# Patient Record
Sex: FEMALE | Race: WHITE | NOT HISPANIC OR LATINO | Employment: FULL TIME | URBAN - METROPOLITAN AREA
[De-identification: names, ages, dates, MRNs, and addresses within clinical notes are randomized per-mention and may not be internally consistent; named-entity substitution may affect disease eponyms.]

---

## 2017-02-13 ENCOUNTER — OFFICE VISIT (OUTPATIENT)
Dept: LAB | Facility: SCHOOL | Age: 47
End: 2017-02-13
Payer: COMMERCIAL

## 2017-02-13 VITALS
BODY MASS INDEX: 35.89 KG/M2 | DIASTOLIC BLOOD PRESSURE: 94 MMHG | RESPIRATION RATE: 16 BRPM | SYSTOLIC BLOOD PRESSURE: 148 MMHG | WEIGHT: 236.8 LBS | TEMPERATURE: 99.9 F | HEART RATE: 88 BPM | OXYGEN SATURATION: 98 % | HEIGHT: 68 IN

## 2017-02-13 DIAGNOSIS — H60.93 OTITIS EXTERNA OF BOTH EARS, UNSPECIFIED CHRONICITY, UNSPECIFIED TYPE: Primary | ICD-10-CM

## 2017-02-13 PROCEDURE — 99213 OFFICE O/P EST LOW 20 MIN: CPT | Performed by: NURSE PRACTITIONER

## 2017-02-13 RX ORDER — CIPROFLOXACIN AND DEXAMETHASONE 3; 1 MG/ML; MG/ML
4 SUSPENSION/ DROPS AURICULAR (OTIC) 2 TIMES DAILY
Qty: 7.5 ML | Refills: 0 | Status: SHIPPED | OUTPATIENT
Start: 2017-02-13 | End: 2017-02-20

## 2017-02-13 NOTE — MR AVS SNAPSHOT
After Visit Summary   2/13/2017    Piper Unger    MRN: [id number]           Patient Information     Date Of Birth          1970        Visit Information        Provider Department      2/13/2017 1:30 PM Jacki Canas APRN CNP [facility] 831        Today's Diagnoses     Otitis externa of both ears, unspecified chronicity, unspecified type    -  1      Care Instructions    Thank you for using the [facility]31 Clinic.  If there is no improvement of your condition, please call and schedule an appointment with your primary care provider.    The medication (s), dosing, route and duration was discussed with the patient.  In addition the drug monograph was reviewed and given to the patient for the medication (s).    External Ear Infection (Adult)    External otitis (also called  swimmer s ear ) is an infection in the ear canal. It is often caused by bacteria or fungus. It can occur a few days after water gets trapped in the ear canal (from swimming or bathing). It can also occur after cleaning too deeply in the ear canal with a cotton swab or other object. Sometimes, hair care products get into the ear canal and cause this problem.  Symptoms can include pain, fever, itching, redness, drainage, or swelling of the ear canal. Temporary hearing loss may also occur.  Home care    Do not try to clean the ear canal. This can push pus and bacteria deeper into the canal.    Use prescribed ear drops as directed. These help reduce swelling and fight the infection. If an ear wick was placed in the ear canal, apply drops right onto the end of the wick. The wick will draw the medication into the ear canal even if it is swollen closed.    A cotton ball may be loosely placed in the outer ear to absorb any drainage.    You may use acetaminophen or ibuprofen to control pain, unless another medication was prescribed. Note: If you have chronic liver or kidney disease or  ever had a stomach ulcer or GI bleeding, talk to your health care provider before taking any of these medications.    Do not allow water to get into your ear when bathing. Also, avoid swimming until the infection has cleared.  Prevention    Keep your ears dry. This helps lower the risk of infection. Dry your ears with a towel or hair dryer after getting wet. Also, use ear plugs when swimming.    Do not stick any objects in the ear to remove wax.    If you feel water trapped in your ear, use ear drops right away. You can get these drops over the counter at most drugstores.  They work by removing water from the ear canal.  Follow-up care  Follow up with your health care provider in one week, or as advised.   When to seek medical advice  Call your health care provider right away if any of these occur:    Ear pain becomes worse or doesn t improve after 3 days of treatment    Redness or swelling of the outer ear occurs or gets worse    Headache    Painful or stiff neck    Drowsiness or confusion    Fever of 100.4 F (38 C) or higher, or as directed by your health care provider    Seizure    1781-6360 [employer]. [address] [place] [address]. All rights reserved. This information is not intended as a substitute for professional medical care. Always follow your healthcare professional's instructions.              Follow-ups after your visit        Who to contact     If you have questions or need follow up information about today's clinic visit or your schedule please contact [place] [address] directly at [phone].  Normal or non-critical lab and imaging results will be communicated to you by MyChart, letter or phone within 4 business days after the clinic has received the results. If you do not hear from us within 7 days, please contact the clinic through MyChart or phone. If you have a critical or abnormal lab result, we will notify you by phone as soon as  "possible.  Submit refill requests through SocialStay or call your pharmacy and they will forward the refill request to us. Please allow 3 business days for your refill to be completed.          Additional Information About Your Visit        SocialStay Information     SocialStay lets you send messages to your doctor, view your test results, renew your prescriptions, schedule appointments and more. To sign up, go to [email or web address] . Click on \"Log in\" on the left side of the screen, which will take you to the Welcome page. Then click on \"Sign up Now\" on the right side of the page.     You will be asked to enter the access code listed below, as well as some personal information. Please follow the directions to create your username and password.     Your access code is: [email or web address]  Expires: 2017  1:59 PM     Your access code will  in 90 days. If you need help or a new code, please call your [place] clinic or [phone].        Care EveryWhere ID     This is your Care EveryWhere ID. This could be used by other organizations to access your [facility] medical records  [id number]        Your Vitals Were     Pulse Temperature Respirations Height Pulse Oximetry BMI (Body Mass Index)    88 99.9  F (37.7  C) (Tympanic) 16 5' 8\" (1.727 m) 98% 36.01 kg/m2       Blood Pressure from Last 3 Encounters:   17 (!) 148/94   11/25/15 165/80   10/22/15 (!) 163/91    Weight from Last 3 Encounters:   17 236 lb 12.8 oz (107.4 kg)   11/25/15 215 lb (97.5 kg)   10/22/15 222 lb (100.7 kg)              Today, you had the following     No orders found for display         Today's Medication Changes          These changes are accurate as of: 17  1:59 PM.  If you have any questions, ask your nurse or doctor.               Start taking these medicines.        Dose/Directions    ciprofloxacin-dexamethasone otic suspension   Commonly known as:  CIPRODEX   Used for:  Otitis externa of both ears, unspecified chronicity, " unspecified type   Started by:  Jacki Canas APRN CNP        Dose:  4 drop   Place 4 drops into both ears 2 times daily for 7 days   Quantity:  7.5 mL   Refills:  0            Where to get your medicines      Some of these will need a paper prescription and others can be bought over the counter.  Ask your nurse if you have questions.     Bring a paper prescription for each of these medications     ciprofloxacin-dexamethasone otic suspension                Primary Care Provider Office Phone # Fax #    Tayler Sobia Whitlock [phone]       [facility] [address] [facility] [address]        Thank you!     Thank you for choosing [place] [address]  for your care. Our goal is always to provide you with excellent care. Hearing back from our patients is one way we can continue to improve our services. Please take a few minutes to complete the written survey that you may receive in the mail after your visit with us. Thank you!             Your Updated Medication List - Protect others around you: Learn how to safely use, store and throw away your medicines at www.disposemymeds.org.          This list is accurate as of: 2/13/17  1:59 PM.  Always use your most recent med list.                   Brand Name Dispense Instructions for use    ciprofloxacin-dexamethasone otic suspension    CIPRODEX    7.5 mL    Place 4 drops into both ears 2 times daily for 7 days       CLARITIN PO      Take by mouth daily       * clobetasol propionate 0.05 % Foam     100 g    Apply sparingly to affected area twice daily as needed.  Do not apply to face. 100 g should last 1 month atleast.       * clobetasol 0.05 % cream    TEMOVATE    30 g    Apply sparingly to affected area twice daily for 14 days.  Do not apply to face.       guaiFENesin 600 MG 12 hr tablet    MUCINEX     Take 600 mg by mouth 2 times daily Reported on 2/13/2017       ketoconazole 2 % shampoo    NIZORAL    120 mL    Apply  to the affected area and wash off after 5 minutes. Use 2-3 times a week.       * Notice:  This list has 2 medication(s) that are the same as other medications prescribed for you. Read the directions carefully, and ask your doctor or other care provider to review them with you.

## 2017-02-13 NOTE — PROGRESS NOTES
SUBJECTIVE:                                                    Piper Unger is a 46 year old female who presents to clinic today for the following health issues:      ENT Symptoms             Symptoms: cc Present Absent Comment   Fever/Chills   x    Fatigue   x    Muscle Aches   x    Eye Irritation   x    Sneezing   x    Nasal Froylan/Drg   x    Sinus Pressure/Pain   x    Loss of smell   x    Dental pain   x    Sore Throat   x    Swollen Glands   x    Ear Pain/Fullness x   Bilateral ear pain and itching, wet, bubbly feeling   Cough   x    Wheeze   x    Chest Pain   x    Shortness of breath   x    Rash   x    Other         Symptom duration:  off and on x2 years, most recent symptoms the last couple weeks   Symptom severity:  moderate   Treatments tried:  ear drops, antibiotic for staph infection, had ears vacuumed out, alcohol and vinegar drops in ear, cream and shampoos   Contacts:  none       Problem list and histories reviewed & adjusted, as indicated.  Additional history: as documented    Patient Active Problem List   Diagnosis     Allergic rhinitis due to other allergen     Irregular menstrual cycle     Other chest pain     Bleeding between periods     Hypothyroidism     Excessive sweating     HYPERLIPIDEMIA LDL GOAL <130     Warts     Obesity (BMI 30-39.9)     Past Surgical History   Procedure Laterality Date     No history of surgery         Social History   Substance Use Topics     Smoking status: Never Smoker     Smokeless tobacco: Never Used     Alcohol use Yes      Comment: occasional     Family History   Problem Relation Age of Onset     Other - See Comments Father      fluid in lungs     Hypertension Father      Allergies Son      eczma     Lipids Sister            ROS:  Constitutional, HEENT, cardiovascular, pulmonary, gi and gu systems are negative, except as otherwise noted.    OBJECTIVE:                                                    BP (!) 148/94 (BP Location: Right arm, Patient Position:  "Chair, Cuff Size: Adult Large)  Pulse 88  Temp 99.9  F (37.7  C) (Tympanic)  Resp 16  Ht 5' 8\" (1.727 m)  Wt 236 lb 12.8 oz (107.4 kg)  SpO2 98%  BMI 36.01 kg/m2  Body mass index is 36.01 kg/(m^2).  GENERAL: healthy, alert and no distress  EYES: Eyes grossly normal to inspection, PERRL and conjunctivae and sclerae normal  HENT: normal cephalic/atraumatic, both ears: purulent drainage in canal and red and boggy canal, nose and mouth without ulcers or lesions, oropharynx clear and oral mucous membranes moist  NECK: no adenopathy and no asymmetry, masses, or scars  RESP: lungs clear to auscultation - no rales, rhonchi or wheezes  CV: regular rates and rhythm and normal S1 S2, no S3 or S4  SKIN: no suspicious lesions or rashes  NEURO: Normal strength and tone, mentation intact and speech normal    Diagnostic Test Results:  none      ASSESSMENT/PLAN:                                                        ICD-10-CM    1. Otitis externa of both ears, unspecified chronicity, unspecified type H60.93 ciprofloxacin-dexamethasone (CIPRODEX) otic suspension       CONSULTATION/REFERRAL to DERMATOLOGY when infection healed.     FUTURE APPOINTMENTS:       - Follow-up visit in 3-5 days with PCP or ENT for persistent symptoms. Sooner PRN new or worsening sx. See PCP regarding ongoing high blood pressures.     Patient Instructions   Thank you for using the [facility] Clinic.  If there is no improvement of your condition, please call and schedule an appointment with your primary care provider.    The medication (s), dosing, route and duration was discussed with the patient.  In addition the drug monograph was reviewed and given to the patient for the medication (s).    External Ear Infection (Adult)    External otitis (also called  swimmer s ear ) is an infection in the ear canal. It is often caused by bacteria or fungus. It can occur a few days after water gets trapped in the ear canal (from swimming or bathing). It " can also occur after cleaning too deeply in the ear canal with a cotton swab or other object. Sometimes, hair care products get into the ear canal and cause this problem.  Symptoms can include pain, fever, itching, redness, drainage, or swelling of the ear canal. Temporary hearing loss may also occur.  Home care    Do not try to clean the ear canal. This can push pus and bacteria deeper into the canal.    Use prescribed ear drops as directed. These help reduce swelling and fight the infection. If an ear wick was placed in the ear canal, apply drops right onto the end of the wick. The wick will draw the medication into the ear canal even if it is swollen closed.    A cotton ball may be loosely placed in the outer ear to absorb any drainage.    You may use acetaminophen or ibuprofen to control pain, unless another medication was prescribed. Note: If you have chronic liver or kidney disease or ever had a stomach ulcer or GI bleeding, talk to your health care provider before taking any of these medications.    Do not allow water to get into your ear when bathing. Also, avoid swimming until the infection has cleared.  Prevention    Keep your ears dry. This helps lower the risk of infection. Dry your ears with a towel or hair dryer after getting wet. Also, use ear plugs when swimming.    Do not stick any objects in the ear to remove wax.    If you feel water trapped in your ear, use ear drops right away. You can get these drops over the counter at most drugstores.  They work by removing water from the ear canal.  Follow-up care  Follow up with your health care provider in one week, or as advised.   When to seek medical advice  Call your health care provider right away if any of these occur:    Ear pain becomes worse or doesn t improve after 3 days of treatment    Redness or swelling of the outer ear occurs or gets worse    Headache    Painful or stiff neck    Drowsiness or confusion    Fever of 100.4 F (38 C) or higher,  or as directed by your health care provider    Seizure    2869-7708 [employer]. [address] [place] [address], [place] [address]. All rights reserved. This information is not intended as a substitute for professional medical care. Always follow your healthcare professional's instructions.            LUPE Tripathi [facility]

## 2017-02-13 NOTE — NURSING NOTE
"Chief Complaint   Patient presents with     Otalgia     bilateral       Initial BP (!) 148/94 (BP Location: Right arm, Patient Position: Chair, Cuff Size: Adult Large)  Pulse 88  Temp 99.9  F (37.7  C) (Tympanic)  Resp 16  Ht 5' 8\" (1.727 m)  Wt 236 lb 12.8 oz (107.4 kg)  SpO2 98%  BMI 36.01 kg/m2 Estimated body mass index is 36.01 kg/(m^2) as calculated from the following:    Height as of this encounter: 5' 8\" (1.727 m).    Weight as of this encounter: 236 lb 12.8 oz (107.4 kg).  Medication Reconciliation: complete  "

## 2017-02-13 NOTE — PATIENT INSTRUCTIONS
Thank you for using the [facility]  Clinic.  If there is no improvement of your condition, please call and schedule an appointment with your primary care provider.    The medication (s), dosing, route and duration was discussed with the patient.  In addition the drug monograph was reviewed and given to the patient for the medication (s).    External Ear Infection (Adult)    External otitis (also called  swimmer s ear ) is an infection in the ear canal. It is often caused by bacteria or fungus. It can occur a few days after water gets trapped in the ear canal (from swimming or bathing). It can also occur after cleaning too deeply in the ear canal with a cotton swab or other object. Sometimes, hair care products get into the ear canal and cause this problem.  Symptoms can include pain, fever, itching, redness, drainage, or swelling of the ear canal. Temporary hearing loss may also occur.  Home care    Do not try to clean the ear canal. This can push pus and bacteria deeper into the canal.    Use prescribed ear drops as directed. These help reduce swelling and fight the infection. If an ear wick was placed in the ear canal, apply drops right onto the end of the wick. The wick will draw the medication into the ear canal even if it is swollen closed.    A cotton ball may be loosely placed in the outer ear to absorb any drainage.    You may use acetaminophen or ibuprofen to control pain, unless another medication was prescribed. Note: If you have chronic liver or kidney disease or ever had a stomach ulcer or GI bleeding, talk to your health care provider before taking any of these medications.    Do not allow water to get into your ear when bathing. Also, avoid swimming until the infection has cleared.  Prevention    Keep your ears dry. This helps lower the risk of infection. Dry your ears with a towel or hair dryer after getting wet. Also, use ear plugs when swimming.    Do not stick any objects in the  ear to remove wax.    If you feel water trapped in your ear, use ear drops right away. You can get these drops over the counter at most drugstores.  They work by removing water from the ear canal.  Follow-up care  Follow up with your health care provider in one week, or as advised.   When to seek medical advice  Call your health care provider right away if any of these occur:    Ear pain becomes worse or doesn t improve after 3 days of treatment    Redness or swelling of the outer ear occurs or gets worse    Headache    Painful or stiff neck    Drowsiness or confusion    Fever of 100.4 F (38 C) or higher, or as directed by your health care provider    Seizure    2286-4331 [employer]. [address] [place] [address], [place] [address]. All rights reserved. This information is not intended as a substitute for professional medical care. Always follow your healthcare professional's instructions.

## 2017-04-12 ENCOUNTER — OFFICE VISIT (OUTPATIENT)
Dept: LAB | Facility: SCHOOL | Age: 47
End: 2017-04-12
Payer: COMMERCIAL

## 2017-04-12 VITALS — HEART RATE: 87 BPM | SYSTOLIC BLOOD PRESSURE: 144 MMHG | DIASTOLIC BLOOD PRESSURE: 82 MMHG

## 2017-04-12 DIAGNOSIS — I10 BENIGN ESSENTIAL HYPERTENSION: Primary | ICD-10-CM

## 2017-04-12 DIAGNOSIS — E66.9 OBESITY (BMI 30-39.9): ICD-10-CM

## 2017-04-12 PROCEDURE — 99213 OFFICE O/P EST LOW 20 MIN: CPT | Performed by: NURSE PRACTITIONER

## 2017-04-12 NOTE — MR AVS SNAPSHOT
After Visit Summary   4/12/2017    Piper Unger    MRN: [id number]           Patient Information     Date Of Birth          1970        Visit Information        Provider Department      4/12/2017 9:30 AM Jacki Canas APRN [facility]         Care Instructions      Women and Heart Disease: Understanding the Risks  Risk factors are habits and conditions that make heart disease more likely. The more you have, the higher your chances of heart attack, also known as acute myocardial infarction, or AMI, and other problems. Most risk factors can be managed to help make your heart healthier. Below are factors that increase your risk for having heart disease.    Smoking  This is the biggest of all the risk factors you can change. Smoking damages the lining of the blood vessels and raises blood pressure. Research shows that smoking makes women up to 6 times more likely to have a heart attack. Also avoid secondhand smoke (smoke from other people s tobacco products).  Diabetes  Diabetes causes high blood sugar, which can damage blood vessels if not kept under control. Having diabetes also makes you more likely to have a silent heart attack--one without any symptoms. You re at risk if your blood sugar level is above 100 mg/dL.  Unhealthy lipid levels  Lipids are fatty substances in the blood. LDL cholesterol and triglycerides (both bad lipids) can build up in artery walls, narrowing the arteries. HDL cholesterol (a good lipid) helps clear bad lipids away. You re at risk if you have:  HDL cholesterol 50 mg/dL or lower; LDL cholesterol 100 mg/dL or higher; triglycerides of 150 mg/dL or higher.  High blood pressure  High blood pressure occurs when blood pushes too hard against artery walls as it travels through the arteries. This damages the lining of the blood vessels making them narrow and stiff. You re at risk if your blood pressure is 120/80 or higher.  Excess  weight  Excess weight makes your heart work harder. This raises your risk of a heart attack. Being overweight also puts you at risk of developing diabetes. Excess weight around the waist or stomach increases your risk the most. Being obese puts you at risk for developing heart disease.  Lack of exercise  Without regular exercise, you re more likely to develop other risk factors, such as being overweight and developing diabetes. High blood pressure and unhealthy lipid levels are also more likely.  Negative emotions  Emotions such as stress and pent-up anger have been linked to heart disease. Over time, these emotions could raise your heart disease risk. If you have heart disease, emotion such as anxiety and depression can make it worse.  Metabolic syndrome  This is caused by a combination of certain risk factors. It puts you at extra high risk of heart disease, stroke, and diabetes. You have metabolic syndrome if you have three or more of the following: low HDL cholesterol; high triglycerides; high blood pressure; high blood sugar; extra weight around the waist.  Risks you can t control  A few risk factors can t be changed. But they still raise your heart disease risk.    Family history. If your mother or sister had heart trouble before age 65 or your father or brother before age 55, you re at higher risk of having a heart attack.    Age. The older you are, the higher your heart disease risk.     For more information    smokefree.gov/sjat-wt-zx-expert    women.smokefree.gov    National [employer] [place] Smoking Quitline: 877-44U-QUIT (101-246-6355)        9290-6000 [employer]. [address] [place] [address], [place] [address]. All rights reserved. This information is not intended as a substitute for professional medical care. Always follow your healthcare professional's instructions.        Eating Heart-Healthy Food: Using the DASH Plan    Eating for your heart doesn t have to be hard or boring. You just need  to know how to make healthier choices. The DASH eating plan has been developed to help you do just that. DASH stands for Dietary Approaches to Stop Hypertension. It is a plan that has been proven to be healthier for your heart and to lower your risk for high blood pressure. It can also help lower your risk for cancer, heart disease, osteoporosis, and diabetes.  Choosing from each food group  Choose foods from each of the food groups below each day. Try to get the recommended number of servings for each food group. The serving numbers are based on a diet of 2,000 calories a day. Talk to your doctor if you re unsure about your calorie needs. Along with getting the correct servings, the DASH plan also recommends a sodium intake less than 2,300 mg per day.        Grains  Servings: 6-8 a day  A serving is:    1 slice bread    1 ounce dry cereal    Half a cup cooked rice, pasta or cereal  Best choices: Whole grains and any grains high in fiber. Vegetables  Servings: 4-5 a day  A serving is:    1 cup raw leafy vegetable    Half a cup cut-up raw or cooked vegetable    Half a cup vegetable juice  Best choices: Fresh or frozen vegetables prepared without added salt or fat.   Fruits  Servings: 4-5 a day  A serving is:    1 medium fruit    One-quarter cup dried fruit    Half a cup fresh, frozen, or canned fruit    Half a cup of 100% fruit juics  Best choices: A variety of fresh fruits of different colors. Whole fruits are a better choice than fruit juices. Low-fat or fat-free dairy  Servings: 2-3 a day  A serving is:    1 cup milk    1 cup yogurt    One and a half ounces cheese  Best choices: Skim or 1% milk, low-fat or fat-free yogurt or buttermilk, and low-fat cheeses.         Lean meats, poultry, fish  Servings: 6 or fewer a day  A serving is:    1 ounce cooked meats, poultry, or fish    1 egg  Best choices: Lean poultry and fish. Trim away visible fat. Broil, grill, roast, or boil instead of frying. Remove skin from poultry  before eating. Limit how much red meat you eat.  Nuts, seeds, beans  Servings: 4-5 a week  A serving is:    One-third cup nuts (one and a half ounces)    2 tablespoons nut butter or seeds    Half a cup cooked dry beans or legumes  Best choices:  Dry roasted  nuts with no salt added, lentils, kidney beans, garbanzo beans, and whole escalera beans.   Fats and oils  Servings: 2-3 a day  A serving is:    1 teaspoon vegetable oil    1 teaspoon soft margarine    1 tablespoon mayonnaise    2 tablespoons salad dressing  Best choices: Nut and vegetable oils (nontropical vegetable oils), such as olive and canola oil. Sweets  Servings: 5 a week or fewer  A serving is:    1 tablespoon sugar, maple syrup, or honey    1 tablespoon jam or jelly    1 half-ounce jelly beans (about 15)    1 cup lemonade  Best choices: Dried fruit can be a satisfying sweet. Choose low-fat sweets. And watch your serving sizes!      For more on the DASH eating plan, visit:  www.nhlbi.nih.gov/health/health-topics/topics/dash     9220-1593 [employer]. [address] [place] [address], [place] [address]. All rights reserved. This information is not intended as a substitute for professional medical care. Always follow your healthcare professional's instructions.        Discharge Instructions for High Blood Pressure (Hypertension)  You have been diagnosed with high blood pressure (also called hypertension). This means the force of blood against your artery walls is too strong. It also means your heart is working hard to move blood. High blood pressure usually has no symptoms, but over time, it can damage your heart, blood vessels, eyes, kidneys, and other organs. With help from your doctor, you can manage your blood pressure and protect your health.  Taking medications    Learn to take your own blood pressure. Keep a record of your results. Ask your doctor which readings mean that you need medical attention.    Take your blood pressure medication exactly  as directed. Don t skip doses. Missing doses can cause your blood pressure to get out of control.    Avoid medications that contain heart stimulants, including over-the-counter drugs. Check for warnings about high blood pressure on the label.    Check with your doctor before taking a decongestant. Some decongestants can worsen high blood pressure.  Lifestyle changes    Maintain a healthy weight. Get help to lose any extra pounds.    Cut back on salt.    Limit canned, dried, packaged, and fast foods.    Don t add salt to your food at the table.    Season foods with herbs instead of salt when you cook.    Follow the DASH (Dietary Approaches to Stop Hypertension) eating plan. This plan recommends vegetables, fruits, whole gains, and other heart healthy foods.    Begin an exercise program. Ask your doctor how to get started. The American Heart Association recommends aerobic exercise 3 to 4 times a week for an average of 40 minutes at a time, with your doctor's approval. Simple activities like walking or gardening can help.    Break the smoking habit. Enroll in a stop-smoking program to improve your chances of success. Ask your health care provider about programs and medications to help you stop smoking.    Limit drinks that contain caffeine (coffee, black or green tea, cola) to 2 per day.    Never take stimulants such as amphetamines or cocaine; these drugs can be deadly for someone with high blood pressure.    Control your stress. Learn stress-management techniques.    Limit alcohol to no more than 1 drink a day for women and 2 drinks a day for men.  Follow-up care  Make a follow-up appointment as directed by our staff.     When to seek medical care  Call your doctor immediately if you have any of the following:    Chest pain or shortness of breath (call 911)    Moderate to severe headache    Weakness in the muscles of your face, arms, or legs    Trouble speaking    Extreme drowsiness    Confusion    Fainting or  "dizziness    Pulsating or rushing sound in your ears    Unexplained nosebleed    Weakness, tingling, or numbness of your face, arms, or legs    Change in vision    Blood pressure measured at home that is greater than 180/110     7819-0714 [employer]. [address] [place] [address]. All rights reserved. This information is not intended as a substitute for professional medical care. Always follow your healthcare professional's instructions.              Follow-ups after your visit        Your next 10 appointments already scheduled     Apr 12, 2017  9:30 AM CDT   School Visit with LUPE Tripathi CNP   [facility]  ([facility] )    [address] [place] [id number] [phone]              Who to contact     If you have questions or need follow up information about today's clinic visit or your schedule please contact [facility]  directly at [phone].  Normal or non-critical lab and imaging results will be communicated to you by Threadboxhart, letter or phone within 4 business days after the clinic has received the results. If you do not hear from us within 7 days, please contact the clinic through Threadboxhart or phone. If you have a critical or abnormal lab result, we will notify you by phone as soon as possible.  Submit refill requests through Planwise or call your pharmacy and they will forward the refill request to us. Please allow 3 business days for your refill to be completed.          Additional Information About Your Visit        ThreadboxharOpinionLab Information     Planwise lets you send messages to your doctor, view your test results, renew your prescriptions, schedule appointments and more. To sign up, go to [email or web address] . Click on \"Log in\" on the left side of the screen, which will take you to the Welcome page. Then click on \"Sign up Now\" on the right side of the page.     You " will be asked to enter the access code listed below, as well as some personal information. Please follow the directions to create your username and password.     Your access code is: [email or web address]  Expires: 2017  2:59 PM     Your access code will  in 90 days. If you need help or a new code, please call your [facility] clinic or [phone].        Care EveryWhere ID     This is your Care EveryWhere ID. This could be used by other organizations to access your [facility] medical records  [id number]        Your Vitals Were     Pulse                   87            Blood Pressure from Last 3 Encounters:   17 144/82   17 (!) 148/94   11/25/15 165/80    Weight from Last 3 Encounters:   17 236 lb 12.8 oz (107.4 kg)   11/25/15 215 lb (97.5 kg)   10/22/15 222 lb (100.7 kg)              Today, you had the following     No orders found for display       Primary Care Provider Office Phone # Fax #    Tayler Sobia Whitlock [phone]       [facility] [address] [facility] [address]        Thank you!     Thank you for choosing [facility] [id number]  for your care. Our goal is always to provide you with excellent care. Hearing back from our patients is one way we can continue to improve our services. Please take a few minutes to complete the written survey that you may receive in the mail after your visit with us. Thank you!             Your Updated Medication List - Protect others around you: Learn how to safely use, store and throw away your medicines at www.disposemymeds.org.          This list is accurate as of: 17  9:26 AM.  Always use your most recent med list.                   Brand Name Dispense Instructions for use    CLARITIN PO      Take by mouth daily       * clobetasol propionate 0.05 % Foam     100 g    Apply sparingly to affected area twice daily as needed.  Do not apply to face. 100 g should last 1 month atleast.       * clobetasol 0.05  % cream    TEMOVATE    30 g    Apply sparingly to affected area twice daily for 14 days.  Do not apply to face.       guaiFENesin 600 MG 12 hr tablet    MUCINEX     Take 600 mg by mouth 2 times daily Reported on 2/13/2017       ketoconazole 2 % shampoo    NIZORAL    120 mL    Apply to the affected area and wash off after 5 minutes. Use 2-3 times a week.       * Notice:  This list has 2 medication(s) that are the same as other medications prescribed for you. Read the directions carefully, and ask your doctor or other care provider to review them with you.

## 2017-04-12 NOTE — PROGRESS NOTES
Patient in today for BP check and to start medications as she is aware she has had elevated BP for a long time. This was reviewed with her at a school clinic visit 2/13/17 by me and she was instructed at that time to see PCP regarding ongoing elevated BP's. Reviewed with her the need to see her PCP for a full fasting physical/bloodwork and also reviewed the damage to the body caused by persistent elevated BP as well as the consequences of that damage. She was given written information, reviewed the symptoms she should seek immediate medical care for. She denies chest pain, dyspnea, dizziness, gerd, diaphoresis, or weakness or tingling in extremities. Patient agreed to plan. Reviewed appropriate uses for school clinic. LUPE Flores CNP

## 2017-04-12 NOTE — PATIENT INSTRUCTIONS
Women and Heart Disease: Understanding the Risks  Risk factors are habits and conditions that make heart disease more likely. The more you have, the higher your chances of heart attack, also known as acute myocardial infarction, or AMI, and other problems. Most risk factors can be managed to help make your heart healthier. Below are factors that increase your risk for having heart disease.    Smoking  This is the biggest of all the risk factors you can change. Smoking damages the lining of the blood vessels and raises blood pressure. Research shows that smoking makes women up to 6 times more likely to have a heart attack. Also avoid secondhand smoke (smoke from other people s tobacco products).  Diabetes  Diabetes causes high blood sugar, which can damage blood vessels if not kept under control. Having diabetes also makes you more likely to have a silent heart attack--one without any symptoms. You re at risk if your blood sugar level is above 100 mg/dL.  Unhealthy lipid levels  Lipids are fatty substances in the blood. LDL cholesterol and triglycerides (both bad lipids) can build up in artery walls, narrowing the arteries. HDL cholesterol (a good lipid) helps clear bad lipids away. You re at risk if you have:  HDL cholesterol 50 mg/dL or lower; LDL cholesterol 100 mg/dL or higher; triglycerides of 150 mg/dL or higher.  High blood pressure  High blood pressure occurs when blood pushes too hard against artery walls as it travels through the arteries. This damages the lining of the blood vessels making them narrow and stiff. You re at risk if your blood pressure is 120/80 or higher.  Excess weight  Excess weight makes your heart work harder. This raises your risk of a heart attack. Being overweight also puts you at risk of developing diabetes. Excess weight around the waist or stomach increases your risk the most. Being obese puts you at risk for developing heart disease.  Lack of exercise  Without regular exercise,  you re more likely to develop other risk factors, such as being overweight and developing diabetes. High blood pressure and unhealthy lipid levels are also more likely.  Negative emotions  Emotions such as stress and pent-up anger have been linked to heart disease. Over time, these emotions could raise your heart disease risk. If you have heart disease, emotion such as anxiety and depression can make it worse.  Metabolic syndrome  This is caused by a combination of certain risk factors. It puts you at extra high risk of heart disease, stroke, and diabetes. You have metabolic syndrome if you have three or more of the following: low HDL cholesterol; high triglycerides; high blood pressure; high blood sugar; extra weight around the waist.  Risks you can t control  A few risk factors can t be changed. But they still raise your heart disease risk.    Family history. If your mother or sister had heart trouble before age 65 or your father or brother before age 55, you re at higher risk of having a heart attack.    Age. The older you are, the higher your heart disease risk.     For more information    smokefree.gov/dboi-ww-vj-expert    women.smokefree.gov    National [employer] [place] Smoking Quitline: 877-44U-QUIT (912-319-2793)        7892-1704 [employer]. [address] [place] [address], [place] [address]. All rights reserved. This information is not intended as a substitute for professional medical care. Always follow your healthcare professional's instructions.        Eating Heart-Healthy Food: Using the DASH Plan    Eating for your heart doesn t have to be hard or boring. You just need to know how to make healthier choices. The DASH eating plan has been developed to help you do just that. DASH stands for Dietary Approaches to Stop Hypertension. It is a plan that has been proven to be healthier for your heart and to lower your risk for high blood pressure. It can also help lower your risk for cancer, heart  disease, osteoporosis, and diabetes.  Choosing from each food group  Choose foods from each of the food groups below each day. Try to get the recommended number of servings for each food group. The serving numbers are based on a diet of 2,000 calories a day. Talk to your doctor if you re unsure about your calorie needs. Along with getting the correct servings, the DASH plan also recommends a sodium intake less than 2,300 mg per day.        Grains  Servings: 6-8 a day  A serving is:    1 slice bread    1 ounce dry cereal    Half a cup cooked rice, pasta or cereal  Best choices: Whole grains and any grains high in fiber. Vegetables  Servings: 4-5 a day  A serving is:    1 cup raw leafy vegetable    Half a cup cut-up raw or cooked vegetable    Half a cup vegetable juice  Best choices: Fresh or frozen vegetables prepared without added salt or fat.   Fruits  Servings: 4-5 a day  A serving is:    1 medium fruit    One-quarter cup dried fruit    Half a cup fresh, frozen, or canned fruit    Half a cup of 100% fruit juics  Best choices: A variety of fresh fruits of different colors. Whole fruits are a better choice than fruit juices. Low-fat or fat-free dairy  Servings: 2-3 a day  A serving is:    1 cup milk    1 cup yogurt    One and a half ounces cheese  Best choices: Skim or 1% milk, low-fat or fat-free yogurt or buttermilk, and low-fat cheeses.         Lean meats, poultry, fish  Servings: 6 or fewer a day  A serving is:    1 ounce cooked meats, poultry, or fish    1 egg  Best choices: Lean poultry and fish. Trim away visible fat. Broil, grill, roast, or boil instead of frying. Remove skin from poultry before eating. Limit how much red meat you eat.  Nuts, seeds, beans  Servings: 4-5 a week  A serving is:    One-third cup nuts (one and a half ounces)    2 tablespoons nut butter or seeds    Half a cup cooked dry beans or legumes  Best choices:  Dry roasted  nuts with no salt added, lentils, kidney beans, garbanzo beans,  and whole escalera beans.   Fats and oils  Servings: 2-3 a day  A serving is:    1 teaspoon vegetable oil    1 teaspoon soft margarine    1 tablespoon mayonnaise    2 tablespoons salad dressing  Best choices: Nut and vegetable oils (nontropical vegetable oils), such as olive and canola oil. Sweets  Servings: 5 a week or fewer  A serving is:    1 tablespoon sugar, maple syrup, or honey    1 tablespoon jam or jelly    1 half-ounce jelly beans (about 15)    1 cup lemonade  Best choices: Dried fruit can be a satisfying sweet. Choose low-fat sweets. And watch your serving sizes!      For more on the DASH eating plan, visit:  www.nhlbi.nih.gov/health/health-topics/topics/dash     8688-9305 [employer]. [address] [place] [address]. All rights reserved. This information is not intended as a substitute for professional medical care. Always follow your healthcare professional's instructions.        Discharge Instructions for High Blood Pressure (Hypertension)  You have been diagnosed with high blood pressure (also called hypertension). This means the force of blood against your artery walls is too strong. It also means your heart is working hard to move blood. High blood pressure usually has no symptoms, but over time, it can damage your heart, blood vessels, eyes, kidneys, and other organs. With help from your doctor, you can manage your blood pressure and protect your health.  Taking medications    Learn to take your own blood pressure. Keep a record of your results. Ask your doctor which readings mean that you need medical attention.    Take your blood pressure medication exactly as directed. Don t skip doses. Missing doses can cause your blood pressure to get out of control.    Avoid medications that contain heart stimulants, including over-the-counter drugs. Check for warnings about high blood pressure on the label.    Check with your doctor before taking a decongestant. Some decongestants can  worsen high blood pressure.  Lifestyle changes    Maintain a healthy weight. Get help to lose any extra pounds.    Cut back on salt.    Limit canned, dried, packaged, and fast foods.    Don t add salt to your food at the table.    Season foods with herbs instead of salt when you cook.    Follow the DASH (Dietary Approaches to Stop Hypertension) eating plan. This plan recommends vegetables, fruits, whole gains, and other heart healthy foods.    Begin an exercise program. Ask your doctor how to get started. The American Heart Association recommends aerobic exercise 3 to 4 times a week for an average of 40 minutes at a time, with your doctor's approval. Simple activities like walking or gardening can help.    Break the smoking habit. Enroll in a stop-smoking program to improve your chances of success. Ask your health care provider about programs and medications to help you stop smoking.    Limit drinks that contain caffeine (coffee, black or green tea, cola) to 2 per day.    Never take stimulants such as amphetamines or cocaine; these drugs can be deadly for someone with high blood pressure.    Control your stress. Learn stress-management techniques.    Limit alcohol to no more than 1 drink a day for women and 2 drinks a day for men.  Follow-up care  Make a follow-up appointment as directed by our staff.     When to seek medical care  Call your doctor immediately if you have any of the following:    Chest pain or shortness of breath (call 911)    Moderate to severe headache    Weakness in the muscles of your face, arms, or legs    Trouble speaking    Extreme drowsiness    Confusion    Fainting or dizziness    Pulsating or rushing sound in your ears    Unexplained nosebleed    Weakness, tingling, or numbness of your face, arms, or legs    Change in vision    Blood pressure measured at home that is greater than 180/110     6469-6555 [employer]. [address] [place] [address], [place] [address]. All rights  reserved. This information is not intended as a substitute for professional medical care. Always follow your healthcare professional's instructions.

## 2017-04-18 ENCOUNTER — OFFICE VISIT (OUTPATIENT)
Dept: FAMILY MEDICINE | Facility: CLINIC | Age: 47
End: 2017-04-18
Payer: COMMERCIAL

## 2017-04-18 VITALS
BODY MASS INDEX: 36.26 KG/M2 | SYSTOLIC BLOOD PRESSURE: 136 MMHG | HEIGHT: 67 IN | DIASTOLIC BLOOD PRESSURE: 88 MMHG | HEART RATE: 60 BPM | WEIGHT: 231 LBS

## 2017-04-18 DIAGNOSIS — I10 HTN, GOAL BELOW 130/80: ICD-10-CM

## 2017-04-18 DIAGNOSIS — L21.9 SEBORRHEIC DERMATITIS: ICD-10-CM

## 2017-04-18 DIAGNOSIS — Z00.00 ROUTINE GENERAL MEDICAL EXAMINATION AT A HEALTH CARE FACILITY: Primary | ICD-10-CM

## 2017-04-18 DIAGNOSIS — L21.9 DERMATITIS, SEBORRHEIC: ICD-10-CM

## 2017-04-18 LAB
ANION GAP SERPL CALCULATED.3IONS-SCNC: 8 MMOL/L (ref 3–14)
BASOPHILS # BLD AUTO: 0.1 10E9/L (ref 0–0.2)
BASOPHILS NFR BLD AUTO: 0.7 %
BUN SERPL-MCNC: 14 MG/DL (ref 7–30)
CALCIUM SERPL-MCNC: 8.8 MG/DL (ref 8.5–10.1)
CHLORIDE SERPL-SCNC: 104 MMOL/L (ref 94–109)
CHOLEST SERPL-MCNC: 281 MG/DL
CO2 SERPL-SCNC: 26 MMOL/L (ref 20–32)
CREAT SERPL-MCNC: 0.77 MG/DL (ref 0.52–1.04)
DIFFERENTIAL METHOD BLD: NORMAL
EOSINOPHIL # BLD AUTO: 0.2 10E9/L (ref 0–0.7)
EOSINOPHIL NFR BLD AUTO: 2.1 %
ERYTHROCYTE [DISTWIDTH] IN BLOOD BY AUTOMATED COUNT: 14.3 % (ref 10–15)
GFR SERPL CREATININE-BSD FRML MDRD: 80 ML/MIN/1.7M2
GLUCOSE SERPL-MCNC: 106 MG/DL (ref 70–99)
HCT VFR BLD AUTO: 44.1 % (ref 35–47)
HDLC SERPL-MCNC: 51 MG/DL
HGB BLD-MCNC: 15.2 G/DL (ref 11.7–15.7)
LDLC SERPL CALC-MCNC: 171 MG/DL
LYMPHOCYTES # BLD AUTO: 2.1 10E9/L (ref 0.8–5.3)
LYMPHOCYTES NFR BLD AUTO: 29.8 %
MCH RBC QN AUTO: 30.3 PG (ref 26.5–33)
MCHC RBC AUTO-ENTMCNC: 34.5 G/DL (ref 31.5–36.5)
MCV RBC AUTO: 88 FL (ref 78–100)
MONOCYTES # BLD AUTO: 0.7 10E9/L (ref 0–1.3)
MONOCYTES NFR BLD AUTO: 10.3 %
NEUTROPHILS # BLD AUTO: 4 10E9/L (ref 1.6–8.3)
NEUTROPHILS NFR BLD AUTO: 57.1 %
NONHDLC SERPL-MCNC: 230 MG/DL
PLATELET # BLD AUTO: 323 10E9/L (ref 150–450)
POTASSIUM SERPL-SCNC: 3.7 MMOL/L (ref 3.4–5.3)
RBC # BLD AUTO: 5.02 10E12/L (ref 3.8–5.2)
SODIUM SERPL-SCNC: 138 MMOL/L (ref 133–144)
T4 FREE SERPL-MCNC: 0.78 NG/DL (ref 0.76–1.46)
TRIGL SERPL-MCNC: 295 MG/DL
TSH SERPL DL<=0.05 MIU/L-ACNC: 1.93 MU/L (ref 0.4–4)
WBC # BLD AUTO: 7 10E9/L (ref 4–11)

## 2017-04-18 PROCEDURE — 80061 LIPID PANEL: CPT | Performed by: FAMILY MEDICINE

## 2017-04-18 PROCEDURE — 36415 COLL VENOUS BLD VENIPUNCTURE: CPT | Performed by: FAMILY MEDICINE

## 2017-04-18 PROCEDURE — 85025 COMPLETE CBC W/AUTO DIFF WBC: CPT | Performed by: FAMILY MEDICINE

## 2017-04-18 PROCEDURE — 87624 HPV HI-RISK TYP POOLED RSLT: CPT | Performed by: FAMILY MEDICINE

## 2017-04-18 PROCEDURE — 84443 ASSAY THYROID STIM HORMONE: CPT | Performed by: FAMILY MEDICINE

## 2017-04-18 PROCEDURE — G0145 SCR C/V CYTO,THINLAYER,RESCR: HCPCS | Performed by: FAMILY MEDICINE

## 2017-04-18 PROCEDURE — 84439 ASSAY OF FREE THYROXINE: CPT | Performed by: FAMILY MEDICINE

## 2017-04-18 PROCEDURE — 99396 PREV VISIT EST AGE 40-64: CPT | Performed by: FAMILY MEDICINE

## 2017-04-18 PROCEDURE — 80048 BASIC METABOLIC PNL TOTAL CA: CPT | Performed by: FAMILY MEDICINE

## 2017-04-18 RX ORDER — KETOCONAZOLE 20 MG/ML
SHAMPOO TOPICAL
Qty: 360 ML | Refills: 11 | Status: SHIPPED | OUTPATIENT
Start: 2017-04-18 | End: 2017-04-18

## 2017-04-18 RX ORDER — LISINOPRIL 5 MG/1
5 TABLET ORAL DAILY
Qty: 30 TABLET | Refills: 11 | Status: SHIPPED | OUTPATIENT
Start: 2017-04-18 | End: 2018-04-11

## 2017-04-18 RX ORDER — KETOCONAZOLE 20 MG/G
CREAM TOPICAL 2 TIMES DAILY
Qty: 15 G | Refills: 1 | Status: SHIPPED | OUTPATIENT
Start: 2017-04-18 | End: 2019-10-07

## 2017-04-18 RX ORDER — KETOCONAZOLE 20 MG/ML
SHAMPOO TOPICAL
Qty: 120 ML | Refills: 11 | Status: SHIPPED | OUTPATIENT
Start: 2017-04-18 | End: 2019-10-07

## 2017-04-18 NOTE — LETTER
April 25, 2017    Piper Estrella Cornel  [address] [facility] [address]    Dear Piper,  We are happy to inform you that your PAP smear result from 4/18/17 is normal.  We are now able to do a follow up test on PAP smears. The DNA test is for HPV (Human Papilloma Virus). Cervical cancer is closely linked with certain types of HPV. Your result showed no evidence of high risk HPV.  Therefore we recommend you return in 3 years for your next pap smear and HPV test.  You will still need to return to the clinic every year for an annual exam and other preventive tests.  Please contact the clinic at [phone] with any questions.  Sincerely,    Tayler Whitlock MD/darwin

## 2017-04-18 NOTE — PROGRESS NOTES
SUBJECTIVE:     CC: Piper Unger is an 46 year old woman who presents for preventive health visit.     Healthy Habits:    Do you get at least three servings of calcium containing foods daily (dairy, green leafy vegetables, etc.)? yes    Amount of exercise or daily activities, outside of work: 1 day(s) per week    Problems taking medications regularly No    Medication side effects: No    Have you had an eye exam in the past two years? yes    Do you see a dentist twice per year? yes    Do you have sleep apnea, excessive snoring or daytime drowsiness?no            Today's PHQ-2 Score:   PHQ-2 ( 1999 [employer]) 4/18/2017 7/31/2015   Q1: Little interest or pleasure in doing things 0 0   Q2: Feeling down, depressed or hopeless 0 0   PHQ-2 Score 0 0       Abuse: Current or Past(Physical, Sexual or Emotional)- No  Do you feel safe in your environment - Yes    Social History   Substance Use Topics     Smoking status: Never Smoker     Smokeless tobacco: Never Used     Alcohol use Yes      Comment: occasional     The patient does not drink >3 drinks per day nor >7 drinks per week.    Recent Labs   Lab Test  11/04/11   0845   CHOL  289*   HDL  46*   LDL  188*   TRIG  278*   CHOLHDLRATIO  6.0*       Reviewed orders with patient.  Reviewed health maintenance and updated orders accordingly - Yes    Mammo Decision Support:  Patient under age 50, mutual decision reflected in health maintenance.      Pertinent mammograms are reviewed under the imaging tab.  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 , bicornate uterus     Pure hypercholesterolemia       Past Surgical History:   Procedure Laterality Date     NO HISTORY OF SURGERY       Obstetric History       T0      TAB0   SAB0   E0   M0   L3       # Outcome Date GA Lbr Fabricio/2nd Weight Sex Delivery Anes PTL Lv   3 Para            2 Para            1 Para               Obstetric Comments    91,93, 03       ROS:  C: NEGATIVE for fever, chills, change in weight  I: NEGATIVE for worrisome rashes, moles or lesions  E: NEGATIVE for vision changes or irritation  ENT: NEGATIVE for ear, mouth and throat problems  R: NEGATIVE for significant cough or SOB  B: NEGATIVE for masses, tenderness or discharge  CV: NEGATIVE for chest pain, palpitations or peripheral edema  GI: NEGATIVE for nausea, abdominal pain, heartburn, or change in bowel habits  : NEGATIVE for unusual urinary or vaginal symptoms. Periods are regular.  M: NEGATIVE for significant arthralgias or myalgia  N: NEGATIVE for weakness, dizziness or paresthesias  P: NEGATIVE for changes in mood or affect    BP Readings from Last 3 Encounters:   17 (!) 136/101   17 144/82   17 (!) 148/94    Wt Readings from Last 3 Encounters:   17 231 lb (104.8 kg)   17 236 lb 12.8 oz (107.4 kg)   11/25/15 215 lb (97.5 kg)                  Patient Active Problem List   Diagnosis     Allergic rhinitis due to other allergen     Irregular menstrual cycle     Other chest pain     Bleeding between periods     Hypothyroidism     Excessive sweating     HYPERLIPIDEMIA LDL GOAL <130     Warts     Obesity (BMI 30-39.9)     Benign essential hypertension     Past Surgical History:   Procedure Laterality Date     NO HISTORY OF SURGERY         Social History   Substance Use Topics     Smoking status: Never Smoker     Smokeless tobacco: Never Used     Alcohol use Yes      Comment: occasional     Family History   Problem Relation Age of  "Onset     Other - See Comments Father      fluid in lungs     Hypertension Father      Hypertension Sister      Hypertension Brother      Allergies Son      eczma     Lipids Sister          Current Outpatient Prescriptions   Medication Sig Dispense Refill     ketoconazole (NIZORAL) 2 % shampoo Apply to the affected area and wash off after 5 minutes. Use 2-3 times a week. 120 mL 3     Loratadine (CLARITIN PO) Take by mouth daily Reported on 4/18/2017       No Known Allergies  Recent Labs   Lab Test  09/18/14   1359  11/09/12   1456  09/29/12   0500  11/04/11   0845   LDL   --    --    --   188*   HDL   --    --    --   46*   TRIG   --    --    --   278*   ALT   --   45  51*   --    CR   --    --   0.71   --    GFRESTIMATED   --    --   89   --    GFRESTBLACK   --    --   >90   --    POTASSIUM   --    --   3.9   --    TSH  1.42  1.36   --   2.13      OBJECTIVE:     BP (!) 136/101  Pulse 60  Ht 5' 7\" (1.702 m)  Wt 231 lb (104.8 kg)  LMP 03/18/2017  BMI 36.18 kg/m2  EXAM:  GENERAL: healthy, alert and no distress  EYES: Eyes grossly normal to inspection, PERRL and conjunctivae and sclerae normal  HENT: ear canals and TM's normal, nose and mouth without ulcers or lesions  NECK: no adenopathy, no asymmetry, masses, or scars and thyroid normal to palpation  RESP: lungs clear to auscultation - no rales, rhonchi or wheezes  BREAST: normal without masses, tenderness or nipple discharge and no palpable axillary masses or adenopathy  CV: regular rate and rhythm, normal S1 S2, no S3 or S4, no murmur, click or rub, no peripheral edema and peripheral pulses strong  ABDOMEN: soft, nontender, no hepatosplenomegaly, no masses and bowel sounds normal   (female): normal female external genitalia, normal urethral meatus , vaginal mucosa pink, moist, well rugated and normal cervix, adnexae, and uterus without masses.  MS: no gross musculoskeletal defects noted, no edema  SKIN: no suspicious lesions or rashes  NEURO: Normal strength " "and tone, mentation intact and speech normal  PSYCH: mentation appears normal, affect normal/bright    ASSESSMENT/PLAN:     1. Routine general medical examination at a health care facility  Low risk cervical cancer, low risk breast cancer.  Discussed weight and techniques for weight loss.  Goal to lose 10% 23 pounds in 23 weeks.  - Pap imaged thin layer screen with HPV - recommended age 30 - 65 years (select HPV order below)  - HPV High Risk Types DNA Cervical    2. HTN, goal below 130/80  due for labs not on meds will start prinivil and recheck in 3 weeks.  - lisinopril (PRINIVIL/ZESTRIL) 5 MG tablet; Take 1 tablet (5 mg) by mouth daily  Dispense: 30 tablet; Refill: 11  - Basic metabolic panel  - Lipid panel reflex to direct LDL  - TSH  - T4 FREE  - CBC with platelets differential    3. Seborrheic dermatitis  Well controled on nizoral shampoo, is out, worst is left ear cannel.  - ketoconazole (NIZORAL) 2 % cream; Apply topically 2 times daily  Dispense: 15 g; Refill: 1    4. Dermatitis, seborrheic  scalp  - ketoconazole (NIZORAL) 2 % cream; Apply topically 2 times daily  Dispense: 15 g; Refill: 1    COUNSELING:   Reviewed preventive health counseling, as reflected in patient instructions       Regular exercise       Healthy diet/nutrition       Vision screening       Hearing screening    BP Screening:   Last 3 BP Readings:    BP Readings from Last 3 Encounters:   04/18/17 (!) 136/101   04/12/17 144/82   02/13/17 (!) 148/94       The following was recommended to the patient:  Re-screen within 4 weeks and recommend lifestyle modifications     reports that she has never smoked. She has never used smokeless tobacco.    Estimated body mass index is 36.18 kg/(m^2) as calculated from the following:    Height as of this encounter: 5' 7\" (1.702 m).    Weight as of this encounter: 231 lb (104.8 kg).   Weight management plan: Discussed healthy diet and exercise guidelines and patient will follow up in 3 months in clinic to " re-evaluate.    Counseling Resources:  ATP IV Guidelines  Pooled Cohorts Equation Calculator  Breast Cancer Risk Calculator  FRAX Risk Assessment  ICSI Preventive Guidelines  Dietary Guidelines for Americans, 2010  USDA's MyPlate  ASA Prophylaxis  Lung CA Screening    Tayler Whitlock MD  [facility]

## 2017-04-18 NOTE — PROGRESS NOTES
Cholesterol is high but risk score is low, thyroid and CBC are normal. Plan recommend weight loss and better diet.  Recheck in a year.  Please notify.        Thank you. GISELE FERNANDEZ MD  The 10-year ASCVD risk score (Trentonmariela FINK Jr, et al., 2013) is: 2.9%    Values used to calculate the score:      Age: 46 years      Sex: Female      Is Non- : No      Diabetic: No      Tobacco smoker: No      Systolic Blood Pressure: 136 mmHg      Is BP treated: Yes      HDL Cholesterol: 51 mg/dL      Total Cholesterol: 281 mg/dL

## 2017-04-18 NOTE — TELEPHONE ENCOUNTER
Pharmacy requesting a new prescription for the ketoconazole shampoo.  Asking for instructions for frequency and a day supply per bottle.  Order pended with previous instructions.  Per the pharmacy one bottle is 120ml and one bottle lasted the patient a year from previous prescription.    Lisbeth Murillo, LAURY

## 2017-04-18 NOTE — MR AVS SNAPSHOT
After Visit Summary   4/18/2017    Piper Unger    MRN: [id number]           Patient Information     Date Of Birth          1970        Visit Information        Provider Department      4/18/2017 8:20 AM Tayler Whitlock MD [facility]        Today's Diagnoses     Routine general medical examination at a health care facility    -  1    HTN, goal below 130/80        Seborrheic dermatitis        Dermatitis, seborrheic          Care Instructions      Preventive Health Recommendations  Female Ages 40 to 49    Yearly exam:     See your health care provider every year in order to  1. Review health changes.   2. Discuss preventive care.    3. Review your medicines if your doctor prescribed any.      Get a Pap test every three years (unless you have an abnormal result and your provider advises testing more often).      If you get Pap tests with HPV test, you only need to test every 5 years, unless you have an abnormal result. You do not need a Pap test if your uterus was removed (hysterectomy) and you have not had cancer.      You should be tested each year for STDs (sexually transmitted diseases), if you're at risk.       Ask your doctor if you should have a mammogram.      Have a colonoscopy (test for colon cancer) if someone in your family has had colon cancer or polyps before age 50.       Have a cholesterol test every 5 years.       Have a diabetes test (fasting glucose) after age 45. If you are at risk for diabetes, you should have this test every 3 years.    Shots: Get a flu shot each year. Get a tetanus shot every 10 years.     Nutrition:     Eat at least 5 servings of fruits and vegetables each day.    Eat whole-grain bread, whole-wheat pasta and brown rice instead of white grains and rice.    Talk to your provider about Calcium and Vitamin D.     Lifestyle    Exercise at least 150 minutes a week (an average of 30 minutes a day, 5 days a week). This will help you control  "your weight and prevent disease.    Limit alcohol to one drink per day.    No smoking.     Wear sunscreen to prevent skin cancer.    See your dentist every six months for an exam and cleaning.        Follow-ups after your visit        Who to contact     If you have questions or need follow up information about today's clinic visit or your schedule please contact [facility] directly at [phone].  Normal or non-critical lab and imaging results will be communicated to you by MyChart, letter or phone within 4 business days after the clinic has received the results. If you do not hear from us within 7 days, please contact the clinic through [employer] or phone. If you have a critical or abnormal lab result, we will notify you by phone as soon as possible.  Submit refill requests through [employer] or call your pharmacy and they will forward the refill request to us. Please allow 3 business days for your refill to be completed.          Additional Information About Your Visit        [employer] [place] Information     [employer] lets you send messages to your doctor, view your test results, renew your prescriptions, schedule appointments and more. To sign up, go to [email or web address] . Click on \"Log in\" on the left side of the screen, which will take you to the Welcome page. Then click on \"Sign up Now\" on the right side of the page.     You will be asked to enter the access code listed below, as well as some personal information. Please follow the directions to create your username and password.     Your access code is: [email or web address]  Expires: 2017  2:59 PM     Your access code will  in 90 days. If you need help or a new code, please call your [facility] or [phone].        Care EveryWhere ID     This is your Care EveryWhere ID. This could be used by other organizations to access your [facility] medical records  [id number]        Your Vitals Were     Pulse Height Last Period BMI (Body Mass Index)    " "      60 5' 7\" (1.702 m) 03/18/2017 36.18 kg/m2         Blood Pressure from Last 3 Encounters:   04/18/17 (!) 136/101   04/12/17 144/82   02/13/17 (!) 148/94    Weight from Last 3 Encounters:   04/18/17 231 lb (104.8 kg)   02/13/17 236 lb 12.8 oz (107.4 kg)   11/25/15 215 lb (97.5 kg)              We Performed the Following     Basic metabolic panel     CBC with platelets differential     Lipid panel reflex to direct LDL     T4 FREE     TSH          Today's Medication Changes          These changes are accurate as of: 4/18/17  9:19 AM.  If you have any questions, ask your nurse or doctor.               Start taking these medicines.        Dose/Directions    lisinopril 5 MG tablet   Commonly known as:  PRINIVIL/ZESTRIL   Used for:  HTN, goal below 130/80   Started by:  Tayler Whitlock MD        Dose:  5 mg   Take 1 tablet (5 mg) by mouth daily   Quantity:  30 tablet   Refills:  11         These medicines have changed or have updated prescriptions.        Dose/Directions    * ketoconazole 2 % shampoo   Commonly known as:  NIZORAL   This may have changed:  Another medication with the same name was added. Make sure you understand how and when to take each.   Used for:  Dermatitis, seborrheic   Changed by:  Catalina Santamaria PA-C        Apply to the affected area and wash off after 5 minutes. Use 2-3 times a week.   Quantity:  120 mL   Refills:  3       * ketoconazole 2 % shampoo   Commonly known as:  NIZORAL   This may have changed:  You were already taking a medication with the same name, and this prescription was added. Make sure you understand how and when to take each.   Used for:  Seborrheic dermatitis   Changed by:  Tayler Whitlock MD        Apply to the affected area and wash off after 5 minutes.   Quantity:  360 mL   Refills:  11       * ketoconazole 2 % cream   Commonly known as:  NIZORAL   This may have changed:  You were already taking a medication with the same name, and this prescription was " added. Make sure you understand how and when to take each.   Used for:  Dermatitis, seborrheic, Seborrheic dermatitis   Changed by:  Tayler Whitlock MD        Apply topically 2 times daily   Quantity:  15 g   Refills:  1       * Notice:  This list has 3 medication(s) that are the same as other medications prescribed for you. Read the directions carefully, and ask your doctor or other care provider to review them with you.         Where to get your medicines      These medications were sent to [id number] - [place], [place] - [address]  [address], [facility] [address]     Phone:  [phone]     ketoconazole 2 % cream    ketoconazole 2 % shampoo    lisinopril 5 MG tablet                Primary Care Provider Office Phone # Fax #    Tayler Whitlock [phone]       [facility] [address] [facility] [address]        Thank you!     Thank you for choosing [facility]  for your care. Our goal is always to provide you with excellent care. Hearing back from our patients is one way we can continue to improve our services. Please take a few minutes to complete the written survey that you may receive in the mail after your visit with us. Thank you!             Your Updated Medication List - Protect others around you: Learn how to safely use, store and throw away your medicines at www.disposemymeds.org.          This list is accurate as of: 4/18/17  9:19 AM.  Always use your most recent med list.                   Brand Name Dispense Instructions for use    CLARITIN PO      Take by mouth daily Reported on 4/18/2017       * ketoconazole 2 % shampoo    NIZORAL    120 mL    Apply to the affected area and wash off after 5 minutes. Use 2-3 times a week.       * ketoconazole 2 % shampoo    NIZORAL    360 mL    Apply to the affected area and wash off after 5 minutes.       * ketoconazole 2 % cream    NIZORAL    15 g    Apply topically 2  times daily       lisinopril 5 MG tablet    PRINIVIL/ZESTRIL    30 tablet    Take 1 tablet (5 mg) by mouth daily       * Notice:  This list has 3 medication(s) that are the same as other medications prescribed for you. Read the directions carefully, and ask your doctor or other care provider to review them with you.

## 2017-04-18 NOTE — LETTER
[facility]  [address] [address]  Phone: [phone]    April 18, 2017    Piper Unger  [address] [facility] [address]          Dear Ms. Unger,    The results of your recent lab tests were:    Cholesterol is high but risk score is low, thyroid and CBC are normal. Plan recommend weight loss and better diet.  Recheck in a year.          TAYLER WHITLOCK MD     The 10-year ASCVD risk score (Erath ALEKS Jr, et al., 2013) is: 2.9%     Values used to calculate the score:       Age: 46 years       Sex: Female       Is Non- : No       Diabetic: No       Tobacco smoker: No       Systolic Blood Pressure: 136 mmHg       Is BP treated: Yes       HDL Cholesterol: 51 mg/dL       Total Cholesterol: 281 mg/dL         Enclosed is a copy of these results.  If you have any further questions or problems, please contact our office.      Sincerely,      Tayler Whitlock MD / JOANN

## 2017-04-18 NOTE — NURSING NOTE
"Chief Complaint   Patient presents with     Physical     annual physical     Gyn Exam     pap smear     Blood Draw     fasting for labs       Initial /88  Pulse 60  Ht 5' 7\" (1.702 m)  Wt 231 lb (104.8 kg)  LMP 03/18/2017  BMI 36.18 kg/m2 Estimated body mass index is 36.18 kg/(m^2) as calculated from the following:    Height as of this encounter: 5' 7\" (1.702 m).    Weight as of this encounter: 231 lb (104.8 kg).  Medication Reconciliation: complete  "

## 2017-04-20 LAB
COPATH REPORT: NORMAL
PAP: NORMAL

## 2017-04-21 ENCOUNTER — TELEPHONE (OUTPATIENT)
Dept: FAMILY MEDICINE | Facility: CLINIC | Age: 47
End: 2017-04-21

## 2017-04-21 LAB
FINAL DIAGNOSIS: NORMAL
HPV HR 12 DNA CVX QL NAA+PROBE: NEGATIVE
HPV16 DNA SPEC QL NAA+PROBE: NEGATIVE
HPV18 DNA SPEC QL NAA+PROBE: NEGATIVE
SPECIMEN DESCRIPTION: NORMAL

## 2017-04-21 NOTE — TELEPHONE ENCOUNTER
Pharmacy says they got reply on shampoo but not cream.  How long will 15 grams last pt-for ins billing purposes

## 2017-04-21 NOTE — TELEPHONE ENCOUNTER
I called the pharmacist.  They are going to use the prescription, ketoconazole cream, as prescribed.    Future amounts can be determined by how quickly pt uses this supply.  Chrissy Porter RN

## 2017-07-11 ENCOUNTER — OFFICE VISIT (OUTPATIENT)
Dept: FAMILY MEDICINE | Facility: CLINIC | Age: 47
End: 2017-07-11
Payer: COMMERCIAL

## 2017-07-11 VITALS
TEMPERATURE: 98.8 F | SYSTOLIC BLOOD PRESSURE: 131 MMHG | HEART RATE: 69 BPM | WEIGHT: 225.6 LBS | BODY MASS INDEX: 35.33 KG/M2 | DIASTOLIC BLOOD PRESSURE: 87 MMHG

## 2017-07-11 DIAGNOSIS — H60.392 OTHER INFECTIVE ACUTE OTITIS EXTERNA OF LEFT EAR: ICD-10-CM

## 2017-07-11 DIAGNOSIS — H65.03 BILATERAL ACUTE SEROUS OTITIS MEDIA, RECURRENCE NOT SPECIFIED: Primary | ICD-10-CM

## 2017-07-11 PROCEDURE — 99214 OFFICE O/P EST MOD 30 MIN: CPT | Performed by: FAMILY MEDICINE

## 2017-07-11 RX ORDER — CIPROFLOXACIN AND DEXAMETHASONE 3; 1 MG/ML; MG/ML
4 SUSPENSION/ DROPS AURICULAR (OTIC) 2 TIMES DAILY
Qty: 7.5 ML | Refills: 0 | Status: SHIPPED | OUTPATIENT
Start: 2017-07-11 | End: 2017-07-18

## 2017-07-11 RX ORDER — AZITHROMYCIN 250 MG/1
250 TABLET, FILM COATED ORAL DAILY
Qty: 6 TABLET | Refills: 1 | Status: SHIPPED | OUTPATIENT
Start: 2017-07-11 | End: 2018-03-12

## 2017-07-11 NOTE — PROGRESS NOTES
SUBJECTIVE:  Piper Unger is a 47 year old female who presents with the following concerns;              Symptoms: cc Present Absent Comment   Fever/Chills   x    Fatigue  x     Muscle Aches   x    Eye Irritation   x    Sneezing   x    Nasal Froylan/Drg   x    Sinus Pressure/Pain  x     Loss of smell   x    Dental pain   x    Sore Throat   x    Swollen Glands   x    Ear Pain/Fullness  x  Left is worst, odor    Cough   x    Wheeze   x    Chest Pain   x    Shortness of breath   x    Rash   x    Other  X  Headache, diarrhea     Symptom duration:  1.5 weeks, has had ongoing ear problems   Sympom severity:  Mod   Treatments tried:  Nizoral Cream, Ciprodex drops   Contacts:  None       Problem list and histories reviewed & adjusted, as indicated.  Additional history: as documented    Patient Active Problem List   Diagnosis     Allergic rhinitis due to other allergen     Hypothyroidism     Excessive sweating     HYPERLIPIDEMIA LDL GOAL <130     Obesity (BMI 30-39.9)     Benign essential hypertension     Past Surgical History:   Procedure Laterality Date     NO HISTORY OF SURGERY         Social History   Substance Use Topics     Smoking status: Never Smoker     Smokeless tobacco: Never Used     Alcohol use Yes      Comment: occasional     Family History   Problem Relation Age of Onset     Other - See Comments Father      fluid in lungs     Hypertension Father      Hypertension Sister      Hypertension Brother      Crohn Disease Son      Allergies Son      eczma     Lipids Sister          Current Outpatient Prescriptions   Medication Sig Dispense Refill     azithromycin (ZITHROMAX Z-MORRIS) 250 MG tablet Take 1 tablet (250 mg) by mouth daily Two tablets first day, then one tablet daily for four days 6 tablet 1     ciprofloxacin-dexamethasone (CIPRODEX) otic suspension Place 4 drops Into the left ear 2 times daily for 7 days 7.5 mL 0     lisinopril (PRINIVIL/ZESTRIL) 5 MG tablet Take 1 tablet (5 mg) by mouth daily 30  tablet 11     ketoconazole (NIZORAL) 2 % cream Apply topically 2 times daily 15 g 1     ketoconazole (NIZORAL) 2 % shampoo Apply to the affected area and wash off after 5 minutes. Use 2-3 times a week. 120 mL 3     Loratadine (CLARITIN PO) Take by mouth daily Reported on 4/18/2017       ketoconazole (NIZORAL) 2 % shampoo Apply to the affected area and wash off after 5 minutes.  Use 2-3 times a week.  30 day supply. 120 mL 11     No Known Allergies  Recent Labs   Lab Test  04/18/17   0925  09/18/14   1359  11/09/12   1456  09/29/12   0500  11/04/11   0845   LDL  171*   --    --    --   188*   HDL  51   --    --    --   46*   TRIG  295*   --    --    --   278*   ALT   --    --   45  51*   --    CR  0.77   --    --   0.71   --    GFRESTIMATED  80   --    --   89   --    GFRESTBLACK  >90   GFR Calc     --    --   >90   --    POTASSIUM  3.7   --    --   3.9   --    TSH  1.93  1.42  1.36   --   2.13      BP Readings from Last 3 Encounters:   07/11/17 131/87   04/18/17 136/88   04/12/17 144/82    Wt Readings from Last 3 Encounters:   07/11/17 225 lb 9.6 oz (102.3 kg)   04/18/17 231 lb (104.8 kg)   02/13/17 236 lb 12.8 oz (107.4 kg)         ROS:  Constitutional, HEENT, cardiovascular, pulmonary, gi and gu systems are negative, except as otherwise noted.    OBJECTIVE:                                                    /87  Pulse 69  Temp 98.8  F (37.1  C) (Tympanic)  Wt 225 lb 9.6 oz (102.3 kg)  BMI 35.33 kg/m2  GENERAL APPEARANCE ADULT: Alert, no acute distress  HENT: right TM abnormal, dull, erythematous, left TM abnormal, dull, erythematous, ear canal:exudate-serous, throat/mouth:normal, mucous membranes moist  RESP: lungs clear to auscultation   CV: normal rate, regular rhythm, no murmur or gallop  Diagnostic Test Results:  none      ASSESSMENT/PLAN:                                                    1. Bilateral acute serous otitis media, recurrence not specified  Viral vs bacterial.  Trial of  antibiotics but if not effective viral infection and self limiting.  If effective and recurs will repeat, see patient instructions.    - azithromycin (ZITHROMAX Z-MORRIS) 250 MG tablet; Take 1 tablet (250 mg) by mouth daily Two tablets first day, then one tablet daily for four days  Dispense: 6 tablet; Refill: 1  - ciprofloxacin-dexamethasone (CIPRODEX) otic suspension; Place 4 drops Into the left ear 2 times daily for 7 days  Dispense: 7.5 mL; Refill: 0    2. Other infective acute otitis externa of left ear  as listed above  - azithromycin (ZITHROMAX Z-MORRIS) 250 MG tablet; Take 1 tablet (250 mg) by mouth daily Two tablets first day, then one tablet daily for four days  Dispense: 6 tablet; Refill: 1  - ciprofloxacin-dexamethasone (CIPRODEX) otic suspension; Place 4 drops Into the left ear 2 times daily for 7 days  Dispense: 7.5 mL; Refill: 0    Tayler Whitlock MD  [facility]

## 2017-07-11 NOTE — NURSING NOTE
"Initial /82  Pulse 72  Temp 98.8  F (37.1  C) (Tympanic)  Wt 225 lb 9.6 oz (102.3 kg)  BMI 35.33 kg/m2 Estimated body mass index is 35.33 kg/(m^2) as calculated from the following:    Height as of 4/18/17: 5' 7\" (1.702 m).    Weight as of this encounter: 225 lb 9.6 oz (102.3 kg). .      "

## 2017-07-11 NOTE — MR AVS SNAPSHOT
"              After Visit Summary   2017    Piper Unger    MRN: [id number]           Patient Information     Date Of Birth          1970        Visit Information        Provider Department      2017 11:40 AM Tayler Whitlock MD [facility]        Today's Diagnoses     Bilateral acute serous otitis media, recurrence not specified    -  1    Other infective acute otitis externa of left ear           Follow-ups after your visit        Who to contact     If you have questions or need follow up information about today's clinic visit or your schedule please contact [facility] directly at [phone].  Normal or non-critical lab and imaging results will be communicated to you by MyChart, letter or phone within 4 business days after the clinic has received the results. If you do not hear from us within 7 days, please contact the clinic through [employer] or phone. If you have a critical or abnormal lab result, we will notify you by phone as soon as possible.  Submit refill requests through [employer] or call your pharmacy and they will forward the refill request to us. Please allow 3 business days for your refill to be completed.          Additional Information About Your Visit        MyChart Information     [employer] lets you send messages to your doctor, view your test results, renew your prescriptions, schedule appointments and more. To sign up, go to [email or web address] . Click on \"Log in\" on the left side of the screen, which will take you to the Welcome page. Then click on \"Sign up Now\" on the right side of the page.     You will be asked to enter the access code listed below, as well as some personal information. Please follow the directions to create your username and password.     Your access code is: [email or web address]  Expires: 10/9/2017 12:18 PM     Your access code will  in 90 days. If you need help or a new code, please call your [facility] or " [phone].        Care EveryWhere ID     This is your Care EveryWhere ID. This could be used by other organizations to access your [facility] medical records  [id number]        Your Vitals Were     Pulse Temperature BMI (Body Mass Index)             72 98.8  F (37.1  C) (Tympanic) 35.33 kg/m2          Blood Pressure from Last 3 Encounters:   07/11/17 147/82   04/18/17 136/88   04/12/17 144/82    Weight from Last 3 Encounters:   07/11/17 225 lb 9.6 oz (102.3 kg)   04/18/17 231 lb (104.8 kg)   02/13/17 236 lb 12.8 oz (107.4 kg)              Today, you had the following     No orders found for display         Today's Medication Changes          These changes are accurate as of: 7/11/17 12:18 PM.  If you have any questions, ask your nurse or doctor.               Start taking these medicines.        Dose/Directions    azithromycin 250 MG tablet   Commonly known as:  ZITHROMAX Z-MORRIS   Used for:  Bilateral acute serous otitis media, recurrence not specified, Other infective acute otitis externa of left ear   Started by:  Tayler Whitlock MD        Dose:  250 mg   Take 1 tablet (250 mg) by mouth daily Two tablets first day, then one tablet daily for four days   Quantity:  6 tablet   Refills:  1       ciprofloxacin-dexamethasone otic suspension   Commonly known as:  CIPRODEX   Used for:  Bilateral acute serous otitis media, recurrence not specified, Other infective acute otitis externa of left ear   Started by:  Tayler Whitlock MD        Dose:  4 drop   Place 4 drops Into the left ear 2 times daily for 7 days   Quantity:  7.5 mL   Refills:  0            Where to get your medicines      These medications were sent to [facility] - [place] [address], [facility] [address]     Phone:  [phone]     azithromycin 250 MG tablet    ciprofloxacin-dexamethasone otic suspension                Primary Care Provider Office Phone # Fax #    Tayler Whitlock  [phone]       [facility] [address] [address]        Equal Access to Services     JOSELO GOMEZ : Rasheeda Villarreal, walatricia dotson, veronicaranulfo frenchurszula bryson, marcia meehan renettavenancio farooq skyler stark. So [facility] [phone].    ATENCIÓN: Si habla español, tiene a navarro disposición servicios gratuitos de asistencia lingüística. Llame al [phone].    We comply with applicable federal civil rights laws and [place] laws. We do not discriminate on the basis of race, color, national origin, age, disability sex, sexual orientation or gender identity.            Thank you!     Thank you for choosing [facility]  for your care. Our goal is always to provide you with excellent care. Hearing back from our patients is one way we can continue to improve our services. Please take a few minutes to complete the written survey that you may receive in the mail after your visit with us. Thank you!             Your Updated Medication List - Protect others around you: Learn how to safely use, store and throw away your medicines at www.disposemymeds.org.          This list is accurate as of: 7/11/17 12:18 PM.  Always use your most recent med list.                   Brand Name Dispense Instructions for use Diagnosis    azithromycin 250 MG tablet    ZITHROMAX Z-MORRIS    6 tablet    Take 1 tablet (250 mg) by mouth daily Two tablets first day, then one tablet daily for four days    Bilateral acute serous otitis media, recurrence not specified, Other infective acute otitis externa of left ear       ciprofloxacin-dexamethasone otic suspension    CIPRODEX    7.5 mL    Place 4 drops Into the left ear 2 times daily for 7 days    Bilateral acute serous otitis media, recurrence not specified, Other infective acute otitis externa of left ear       CLARITIN PO      Take by mouth daily Reported on 4/18/2017        * ketoconazole 2 % shampoo    NIZORAL    120 mL    Apply to the  affected area and wash off after 5 minutes. Use 2-3 times a week.    Dermatitis, seborrheic       * ketoconazole 2 % cream    NIZORAL    15 g    Apply topically 2 times daily    Dermatitis, seborrheic, Seborrheic dermatitis       * ketoconazole 2 % shampoo    NIZORAL    120 mL    Apply to the affected area and wash off after 5 minutes.  Use 2-3 times a week.  30 day supply.    Seborrheic dermatitis       lisinopril 5 MG tablet    PRINIVIL/ZESTRIL    30 tablet    Take 1 tablet (5 mg) by mouth daily    HTN, goal below 130/80       * Notice:  This list has 3 medication(s) that are the same as other medications prescribed for you. Read the directions carefully, and ask your doctor or other care provider to review them with you.

## 2017-11-28 DIAGNOSIS — H60.392 OTHER INFECTIVE ACUTE OTITIS EXTERNA OF LEFT EAR: ICD-10-CM

## 2017-11-28 DIAGNOSIS — H65.03 BILATERAL ACUTE SEROUS OTITIS MEDIA, RECURRENCE NOT SPECIFIED: ICD-10-CM

## 2017-11-28 NOTE — TELEPHONE ENCOUNTER
azithromycin    Last Written Prescription Date:  07/11/2017  Last Fill Quantity: 6,   # refills: 1  Last Office Visit: 07/11/2017  Future Office visit:       Routing refill request to provider for review/approval because:  Drug not on the [facility], [facility] or [facility] refill protocol or controlled substance

## 2017-12-01 RX ORDER — AZITHROMYCIN 250 MG/1
TABLET, FILM COATED ORAL
Qty: 6 TABLET | OUTPATIENT
Start: 2017-12-01

## 2018-03-12 ENCOUNTER — OFFICE VISIT (OUTPATIENT)
Dept: FAMILY MEDICINE | Facility: CLINIC | Age: 48
End: 2018-03-12
Payer: COMMERCIAL

## 2018-03-12 DIAGNOSIS — K62.5 RECTAL BLEEDING: Primary | ICD-10-CM

## 2018-03-12 PROCEDURE — 99214 OFFICE O/P EST MOD 30 MIN: CPT | Performed by: NURSE PRACTITIONER

## 2018-03-12 RX ORDER — HYDROCORTISONE ACETATE 25 MG/1
25 SUPPOSITORY RECTAL 2 TIMES DAILY PRN
Qty: 28 SUPPOSITORY | Refills: 0 | Status: SHIPPED | OUTPATIENT
Start: 2018-03-12 | End: 2020-09-28

## 2018-03-12 NOTE — PATIENT INSTRUCTIONS
Schedule colonoscopy    Possible internal hemorrhoids.    Can try Anusol supp twice daily as needed for pain and bleeding  Stat Metamucil fiber supplement.

## 2018-03-12 NOTE — MR AVS SNAPSHOT
After Visit Summary   3/12/2018    Piper Unger    MRN: [id number]           Patient Information     Date Of Birth          1970        Visit Information        Provider Department      3/12/2018 2:20 PM Janna Ramsey APRN CNP [facility]        Today's Diagnoses     Rectal bleeding    -  1      Care Instructions    Schedule colonoscopy    Possible internal hemorrhoids.    Can try Anusol supp twice daily as needed for pain and bleeding  Stat Metamucil fiber supplement.                     Follow-ups after your visit        Additional Services     GASTROENTEROLOGY ADULT REF PROCEDURE ONLY       Last Lab Result: Creatinine (mg/dL)       Date                     Value                 04/18/2017               0.77             ----------  Body mass index is 35.96 kg/(m^2).     Needed:  No  Language:  English    Patient will be contacted to schedule procedure.     Please be aware that coverage of these services is subject to the terms and limitations of your health insurance plan.  Call member services at your health plan with any benefit or coverage questions.  Any procedures must be performed at a [facility] facility OR coordinated by your clinic's referral office.    Please bring the following with you to your appointment:    (1) Any X-Rays, CTs or MRIs which have been performed.  Contact the facility where they were done to arrange for  prior to your scheduled appointment.    (2) List of current medications   (3) This referral request   (4) Any documents/labs given to you for this referral                  Who to contact     If you have questions or need follow up information about today's clinic visit or your schedule please contact [facility] directly at [phone].  Normal or non-critical lab and imaging results will be communicated to you by MyChart, letter or phone within 4 business days after the clinic has received the results. If  "you do not hear from us within 7 days, please contact the clinic through [employer] or phone. If you have a critical or abnormal lab result, we will notify you by phone as soon as possible.  Submit refill requests through [employer] or call your pharmacy and they will forward the refill request to us. Please allow 3 business days for your refill to be completed.          Additional Information About Your Visit        [employer] Information     [employer] lets you send messages to your doctor, view your test results, renew your prescriptions, schedule appointments and more. To sign up, go to [email or web address] . Click on \"Log in\" on the left side of the screen, which will take you to the Welcome page. Then click on \"Sign up Now\" on the right side of the page.     You will be asked to enter the access code listed below, as well as some personal information. Please follow the directions to create your username and password.     Your access code is: [email or web address]  Expires: 6/10/2018  2:46 PM     Your access code will  in 90 days. If you need help or a new code, please call your [place] clinic or [phone].        Care EveryWhere ID     This is your Care EveryWhere ID. This could be used by other organizations to access your [place] medical records  [id number]        Your Vitals Were     Pulse Temperature Height BMI (Body Mass Index)          102 99.8  F (37.7  C) (Tympanic) 5' 6.75\" (1.695 m) 35.96 kg/m2         Blood Pressure from Last 3 Encounters:   18 (!) 146/99   17 131/87   17 136/88    Weight from Last 3 Encounters:   18 227 lb 14.4 oz (103.4 kg)   17 225 lb 9.6 oz (102.3 kg)   17 231 lb (104.8 kg)              We Performed the Following     GASTROENTEROLOGY ADULT REF PROCEDURE ONLY          Today's Medication Changes          These changes are accurate as of 3/12/18  2:46 PM.  If you have any questions, ask your nurse or doctor.               Start taking these medicines. "        Dose/Directions    hydrocortisone 25 MG Suppository   Commonly known as:  ANUSOL-HC   Used for:  Rectal bleeding   Started by:  Janna Ramsey APRN CNP        Dose:  25 mg   Place 1 suppository (25 mg) rectally 2 times daily as needed for hemorrhoids   Quantity:  28 suppository   Refills:  0            Where to get your medicines      These medications were sent to [id number] - [place], [place] - [address]  [address], [facility] [address]     Phone:  [phone]     hydrocortisone 25 MG Suppository                Primary Care Provider Office Phone # Fax #    Tayler Sobia Whitlock [phone] [address] [address]        Equal Access to Services     JOSELO GOMEZ : Rasheeda emeryo Sojerrica, waaxda luqadaha, qaybta kaalmada adevenancioyakatty, marcia stark. So [facility] [phone].    ATENCIÓN: Si habla español, tiene a navarro disposición servicios gratuitos de asistencia lingüística. Llame al [phone].    We comply with applicable federal civil rights laws and [place] laws. We do not discriminate on the basis of race, color, national origin, age, disability, sex, sexual orientation, or gender identity.            Thank you!     Thank you for choosing [facility]  for your care. Our goal is always to provide you with excellent care. Hearing back from our patients is one way we can continue to improve our services. Please take a few minutes to complete the written survey that you may receive in the mail after your visit with us. Thank you!             Your Updated Medication List - Protect others around you: Learn how to safely use, store and throw away your medicines at www.disposemymeds.org.          This list is accurate as of 3/12/18  2:46 PM.  Always use your most recent med list.                   Brand Name Dispense Instructions for use Diagnosis    CLARITIN PO      Take by mouth  daily Reported on 4/18/2017        hydrocortisone 25 MG Suppository    ANUSOL-HC    28 suppository    Place 1 suppository (25 mg) rectally 2 times daily as needed for hemorrhoids    Rectal bleeding       * ketoconazole 2 % shampoo    NIZORAL    120 mL    Apply to the affected area and wash off after 5 minutes. Use 2-3 times a week.    Dermatitis, seborrheic       * ketoconazole 2 % cream    NIZORAL    15 g    Apply topically 2 times daily    Dermatitis, seborrheic, Seborrheic dermatitis       * ketoconazole 2 % shampoo    NIZORAL    120 mL    Apply to the affected area and wash off after 5 minutes.  Use 2-3 times a week.  30 day supply.    Seborrheic dermatitis       lisinopril 5 MG tablet    PRINIVIL/ZESTRIL    30 tablet    Take 1 tablet (5 mg) by mouth daily    HTN, goal below 130/80       * Notice:  This list has 3 medication(s) that are the same as other medications prescribed for you. Read the directions carefully, and ask your doctor or other care provider to review them with you.

## 2018-03-12 NOTE — PROGRESS NOTES
SUBJECTIVE:   Piper Unger is a 47 year old female who presents to clinic today for the following health issues: reports she had diarrhea multiple times, water last Thursday, it is resolved now. What concerns the patient is that she had some blood in her stool on Sunday, states when she wiped she noted blood, she is not sure how much blood was in the toilet, she did not look. She states she has history of colon polyps, but when I reviewed last colonoscopy in 2009, it was normal. She will be due for screening colonoscopy next year, but since she is having symptoms I recommended diagnostic colonoscopy. She reports her son, who is 25 y.o. Was just diagnosed with Crohn disease, her daughter has lactose intolerance, the patient denies personal history of colitis, Celiac disease, diverticulitis and hemorrhoids. She also denies N/V, melena, hematuria, dysuria  and fever.        ABDOMINAL PAIN     Onset: 2 weeks     Description:   Character: bloated   Location: lower abdominal cramping had on Sunday, resolved now  Radiation: None    Intensity: moderate    Progression of Symptoms:  same    Accompanying Signs & Symptoms:  Fever/Chills?: hot and cold flashes, possibly going through . No fever  Gas/Bloating: YES   Nausea: no   Vomitting: no   Diarrhea?: Last time she had diarrhea was Thursday, Sunday she had a bowel movement and it was bloody   Constipation:no   Dysuria or Hematuria: no    History:   Trauma: no   Previous similar pain: no    Previous tests done: none    Precipitating factors:   Does the pain change with:     Food: no      BM: no     Urination: no     Alleviating factors:  None     Therapies Tried and outcome: none     LMP:  Sometime in January        Patient state she did have a Colonoscopy in 2009 and had to have polyps removed.     Problem list and histories reviewed & adjusted, as indicated.  Additional history: as documented    Labs reviewed in EPIC    Reviewed and updated as needed this visit by  "clinical staff  Tobacco  Allergies  Meds  Problems  Med Hx  Surg Hx  Fam Hx  Soc Hx        Reviewed and updated as needed this visit by Provider  Allergies  Meds  Problems         ROS:  Constitutional, HEENT, cardiovascular, pulmonary, gi and gu systems are negative, except as otherwise noted.    OBJECTIVE:     /78  Pulse 102  Temp 99.8  F (37.7  C) (Tympanic)  Ht 5' 6.75\" (1.695 m)  Wt 227 lb 14.4 oz (103.4 kg)  BMI 35.96 kg/m2  Body mass index is 35.96 kg/(m^2).  GENERAL: healthy, alert and no distress  ABDOMEN: soft, nontender, no hepatosplenomegaly, no masses and bowel sounds normal  BACK: no CVA tenderness, no paralumbar tenderness  PSYCH: mentation appears normal, affect normal/bright    Diagnostic Test Results:  Recommended colonoscopy, results pending     ASSESSMENT/PLAN:     1. Rectal bleeding  -recommended to repeat colonoscopy  -advised patient that bleeding possibly was due to internal hemorrhoids and recommended to start Metamucil and Anusol as needed   - GASTROENTEROLOGY ADULT REF PROCEDURE ONLY  - hydrocortisone (ANUSOL-HC) 25 MG Suppository; Place 1 suppository (25 mg) rectally 2 times daily as needed for hemorrhoids  Dispense: 28 suppository; Refill: 0    See Patient Instructions    LUPE Aguilar [facility]  "

## 2018-03-12 NOTE — NURSING NOTE
"Chief Complaint   Patient presents with     Abdominal Pain     off and on for 2 weeks, feeling bloated     Fatigue       Initial BP (!) 146/99  Pulse 102  Temp 99.8  F (37.7  C) (Tympanic)  Ht 5' 6.75\" (1.695 m)  Wt 227 lb 14.4 oz (103.4 kg)  BMI 35.96 kg/m2 Estimated body mass index is 35.96 kg/(m^2) as calculated from the following:    Height as of this encounter: 5' 6.75\" (1.695 m).    Weight as of this encounter: 227 lb 14.4 oz (103.4 kg).  Medication Reconciliation: complete  "

## 2018-03-13 VITALS
HEART RATE: 102 BPM | SYSTOLIC BLOOD PRESSURE: 140 MMHG | TEMPERATURE: 99.8 F | DIASTOLIC BLOOD PRESSURE: 78 MMHG | WEIGHT: 227.9 LBS | HEIGHT: 67 IN | BODY MASS INDEX: 35.77 KG/M2

## 2018-03-14 ENCOUNTER — ANESTHESIA EVENT (OUTPATIENT)
Dept: GASTROENTEROLOGY | Facility: CLINIC | Age: 48
End: 2018-03-14
Payer: COMMERCIAL

## 2018-03-15 ENCOUNTER — ANESTHESIA (OUTPATIENT)
Dept: GASTROENTEROLOGY | Facility: CLINIC | Age: 48
End: 2018-03-15
Payer: COMMERCIAL

## 2018-03-15 ENCOUNTER — SURGERY (OUTPATIENT)
Age: 48
End: 2018-03-15

## 2018-03-15 ENCOUNTER — HOSPITAL ENCOUNTER (OUTPATIENT)
Facility: CLINIC | Age: 48
Discharge: HOME | End: 2018-03-15
Attending: SURGERY | Admitting: SURGERY
Payer: COMMERCIAL

## 2018-03-15 VITALS
RESPIRATION RATE: 16 BRPM | OXYGEN SATURATION: 98 % | BODY MASS INDEX: 35.63 KG/M2 | SYSTOLIC BLOOD PRESSURE: 123 MMHG | DIASTOLIC BLOOD PRESSURE: 76 MMHG | HEIGHT: 67 IN | TEMPERATURE: 98.3 F | WEIGHT: 227 LBS

## 2018-03-15 LAB
COLONOSCOPY: NORMAL
HCG UR QL: NEGATIVE

## 2018-03-15 PROCEDURE — 45378 DIAGNOSTIC COLONOSCOPY: CPT | Performed by: SURGERY

## 2018-03-15 PROCEDURE — 25000125 ZZHC RX 250: Performed by: SURGERY

## 2018-03-15 PROCEDURE — 37000008 ZZH ANESTHESIA TECHNICAL FEE, 1ST 30 MIN: Performed by: SURGERY

## 2018-03-15 PROCEDURE — 25000128 H RX IP 250 OP 636: Performed by: NURSE ANESTHETIST, CERTIFIED REGISTERED

## 2018-03-15 PROCEDURE — 81025 URINE PREGNANCY TEST: CPT | Performed by: NURSE ANESTHETIST, CERTIFIED REGISTERED

## 2018-03-15 PROCEDURE — 25000128 H RX IP 250 OP 636: Performed by: SURGERY

## 2018-03-15 PROCEDURE — 25000125 ZZHC RX 250: Performed by: NURSE ANESTHETIST, CERTIFIED REGISTERED

## 2018-03-15 RX ORDER — PROPOFOL 10 MG/ML
INJECTION, EMULSION INTRAVENOUS CONTINUOUS PRN
Status: DISCONTINUED | OUTPATIENT
Start: 2018-03-15 | End: 2018-03-15

## 2018-03-15 RX ORDER — ONDANSETRON 2 MG/ML
4 INJECTION INTRAMUSCULAR; INTRAVENOUS
Status: DISCONTINUED | OUTPATIENT
Start: 2018-03-15 | End: 2018-03-15 | Stop reason: HOSPADM

## 2018-03-15 RX ORDER — SODIUM CHLORIDE, SODIUM LACTATE, POTASSIUM CHLORIDE, CALCIUM CHLORIDE 600; 310; 30; 20 MG/100ML; MG/100ML; MG/100ML; MG/100ML
INJECTION, SOLUTION INTRAVENOUS CONTINUOUS
Status: DISCONTINUED | OUTPATIENT
Start: 2018-03-15 | End: 2018-03-15 | Stop reason: HOSPADM

## 2018-03-15 RX ORDER — LIDOCAINE HYDROCHLORIDE 10 MG/ML
INJECTION, SOLUTION INFILTRATION; PERINEURAL PRN
Status: DISCONTINUED | OUTPATIENT
Start: 2018-03-15 | End: 2018-03-15

## 2018-03-15 RX ORDER — PROPOFOL 10 MG/ML
INJECTION, EMULSION INTRAVENOUS PRN
Status: DISCONTINUED | OUTPATIENT
Start: 2018-03-15 | End: 2018-03-15

## 2018-03-15 RX ORDER — LIDOCAINE 40 MG/G
CREAM TOPICAL
Status: DISCONTINUED | OUTPATIENT
Start: 2018-03-15 | End: 2018-03-15 | Stop reason: HOSPADM

## 2018-03-15 RX ADMIN — PROPOFOL 200 MCG/KG/MIN: 10 INJECTION, EMULSION INTRAVENOUS at 14:30

## 2018-03-15 RX ADMIN — LIDOCAINE HYDROCHLORIDE 1 ML: 10 INJECTION, SOLUTION EPIDURAL; INFILTRATION; INTRACAUDAL; PERINEURAL at 13:40

## 2018-03-15 RX ADMIN — LIDOCAINE HYDROCHLORIDE 50 MG: 10 INJECTION, SOLUTION INFILTRATION; PERINEURAL at 14:30

## 2018-03-15 RX ADMIN — SODIUM CHLORIDE, POTASSIUM CHLORIDE, SODIUM LACTATE AND CALCIUM CHLORIDE: 600; 310; 30; 20 INJECTION, SOLUTION INTRAVENOUS at 13:40

## 2018-03-15 RX ADMIN — PROPOFOL 100 MG: 10 INJECTION, EMULSION INTRAVENOUS at 14:30

## 2018-03-15 NOTE — ANESTHESIA CARE TRANSFER NOTE
Patient: Piper Unger    Procedure(s):  colonoscopy - Wound Class: II-Clean Contaminated    Diagnosis: rectal bleeding  Diagnosis Additional Information: No value filed.    Anesthesia Type:   No value filed.     Note:  Airway :Nasal Cannula  Patient transferred to:Phase II  Handoff Report: Identifed the Patient, Identified the Reponsible Provider, Reviewed the pertinent medical history, Discussed the surgical course, Reviewed Intra-OP anesthesia mangement and issues during anesthesia, Set expectations for post-procedure period and Allowed opportunity for questions and acknowledgement of understanding      Vitals: (Last set prior to Anesthesia Care Transfer)    CRNA VITALS  3/15/2018 1413 - 3/15/2018 1447      3/15/2018             Pulse: 77    SpO2: 98 %                Electronically Signed By: LUPE Falcon CRNA  March 15, 2018  2:47 PM

## 2018-03-15 NOTE — ANESTHESIA POSTPROCEDURE EVALUATION
Patient: Piper Unger    Procedure(s):  colonoscopy - Wound Class: II-Clean Contaminated    Diagnosis:rectal bleeding  Diagnosis Additional Information: No value filed.    Anesthesia Type:  No value filed.    Note:  Anesthesia Post Evaluation    Patient location during evaluation: Bedside  Patient participation: Able to fully participate in evaluation  Level of consciousness: sleepy but conscious  Pain management: adequate  Cardiovascular status: stable  Respiratory status: nasal cannula  Hydration status: stable  PONV: none     Anesthetic complications: None          Last vitals:  Vitals:    03/15/18 1310 03/15/18 1445   BP: 140/89 112/74   Resp: 18 18   Temp: 36.8  C (98.3  F)    SpO2: 98% 99%         Electronically Signed By: LUPE Falcon CRNA  March 15, 2018  2:47 PM

## 2018-03-15 NOTE — H&P
"47 year old year old female here for colonoscopy for blood in stool.    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       Allergies   Allergen Reactions     Dust Mites      Pollen Extract        Pt reports that she has never smoked. She has never used smokeless tobacco. She reports that she drinks alcohol. She reports that she does not use illicit drugs.    Exam:  /89  Temp 98.3  F (36.8  C) (Oral)  Resp 18  Ht 1.695 m (5' 6.75\")  Wt 103 kg (227 lb)  SpO2 98%  BMI 35.82 kg/m2    Awake, Alert OX3  Lungs - CTA bilaterally  CV - RRR, no murmurs, distal pulses intact  Abd - soft, non-distended, non-tender, +BS  Extr - No cyanosis or edema    A/P 47 year old year old female in need of colonoscopy for blood in stool. Risks, benefits, alternatives, and complications were discussed including the possibility of perforation and the patient agreed to proceed.    Shahzad Villafana MD     "

## 2018-04-11 DIAGNOSIS — I10 HTN, GOAL BELOW 130/80: ICD-10-CM

## 2018-04-11 NOTE — TELEPHONE ENCOUNTER
"Requested Prescriptions   Pending Prescriptions Disp Refills     lisinopril (PRINIVIL/ZESTRIL) 5 MG tablet [Pharmacy Med Name: LISINOPRIL 5MG TAB]  Last Written Prescription Date:  04/18/2017  Last Fill Quantity: 30,  # refills: 11   Last office visit: 3/12/2018 with prescribing provider:  Braulio   Future Office Visit:     30 tablet      Sig: TAKE 1 TABLET BY MOUTH DAILY    ACE Inhibitors (Including Combos) Protocol Passed    4/11/2018  1:02 AM       Passed - Blood pressure under 140/90 in past 12 months    BP Readings from Last 3 Encounters:   03/15/18 123/76   03/12/18 140/78   07/11/17 131/87                Passed - Recent (12 mo) or future (30 days) visit within the authorizing provider's specialty    Patient had office visit in the last 12 months or has a visit in the next 30 days with authorizing provider or within the authorizing provider's specialty.  See \"Patient Info\" tab in inbasket, or \"Choose Columns\" in Meds & Orders section of the refill encounter.           Passed - Patient is age 18 or older       Passed - No active pregnancy on record       Passed - Normal serum creatinine on file in past 12 months    Recent Labs   Lab Test  04/18/17   0925   CR  0.77            Passed - Normal serum potassium on file in past 12 months    Recent Labs   Lab Test  04/18/17   0925   POTASSIUM  3.7            Passed - No positive pregnancy test in past 12 months        Ubaldo Desai RT (R)    "

## 2018-04-13 RX ORDER — LISINOPRIL 5 MG/1
TABLET ORAL
Qty: 30 TABLET | Refills: 3 | Status: SHIPPED | OUTPATIENT
Start: 2018-04-13 | End: 2018-08-14

## 2018-05-15 ENCOUNTER — OFFICE VISIT (OUTPATIENT)
Dept: FAMILY MEDICINE | Facility: CLINIC | Age: 48
End: 2018-05-15
Payer: COMMERCIAL

## 2018-05-15 VITALS
OXYGEN SATURATION: 94 % | WEIGHT: 230.6 LBS | TEMPERATURE: 98.9 F | SYSTOLIC BLOOD PRESSURE: 151 MMHG | BODY MASS INDEX: 36.19 KG/M2 | HEIGHT: 67 IN | DIASTOLIC BLOOD PRESSURE: 89 MMHG | HEART RATE: 93 BPM

## 2018-05-15 DIAGNOSIS — Z12.31 ENCOUNTER FOR SCREENING MAMMOGRAM FOR BREAST CANCER: ICD-10-CM

## 2018-05-15 DIAGNOSIS — H60.542 DERMATITIS OF EAR CANAL, LEFT: Primary | ICD-10-CM

## 2018-05-15 PROCEDURE — 99213 OFFICE O/P EST LOW 20 MIN: CPT | Performed by: INTERNAL MEDICINE

## 2018-05-15 RX ORDER — PREDNISOLONE SODIUM PHOSPHATE 10 MG/ML
SOLUTION/ DROPS OPHTHALMIC
Qty: 10 ML | Refills: 3 | Status: SHIPPED | OUTPATIENT
Start: 2018-05-15 | End: 2019-10-07

## 2018-05-15 NOTE — PROGRESS NOTES
SUBJECTIVE:   Piper Unger is a 48 year old female who presents to clinic today for the following health issues:  Chief Complaint   Patient presents with     Otalgia     x 2 weeks, burning from left ear, some itching and crusty discharge. Has on/off symptoms for a couple of years.      ENT Symptoms             Symptoms: cc Present Absent Comment   Fever/Chills   x    Fatigue  x     Muscle Aches   x    Eye Irritation  x  Watery eyes   Sneezing   x    Nasal Froylan/Drg  x  A bit runny    Sinus Pressure/Pain   x    Loss of smell   x    Dental pain   x    Sore Throat   x    Swollen Glands   x    Ear Pain/Fullness x x  Hearing in left ear is diminished    Cough  x  Coughs all the time, may be allergies.    Wheeze   x    Chest Pain   x    Shortness of breath   x    Rash   x    Other         Symptom duration:  x 2 weeks    Symptom severity:  no pain, more burning    Treatments tried:  none    Contacts:  no known        Piper reports a history of eczema in the left ear and has previously been prescribed some ear drops and creams.  However, she is concerned she may now have an infection since there is more burning than usual.     Problem list and histories reviewed & adjusted, as indicated.  Additional history: as documented    Patient Active Problem List   Diagnosis     Allergic rhinitis due to other allergen     Hypothyroidism     Excessive sweating     HYPERLIPIDEMIA LDL GOAL <130     Obesity (BMI 30-39.9)     Benign essential hypertension     Past Surgical History:   Procedure Laterality Date     COLONOSCOPY N/A 3/15/2018    Procedure: COLONOSCOPY;  colonoscopy;  Surgeon: Shahzad Villafana MD;  [facility]     NO HISTORY OF SURGERY         Social History   Substance Use Topics     Smoking status: Never Smoker     Smokeless tobacco: Never Used     Alcohol use Yes      Comment: occasional     Family History   Problem Relation Age of Onset     Other - See Comments Father      fluid in lungs     Hypertension Father   "    Hypertension Sister      Hypertension Brother      Crohn Disease Son      Allergies Son      eczma     Lipids Sister          Current Outpatient Prescriptions   Medication Sig Dispense Refill     hydrocortisone (ANUSOL-HC) 25 MG Suppository Place 1 suppository (25 mg) rectally 2 times daily as needed for hemorrhoids 28 suppository 0     ketoconazole (NIZORAL) 2 % cream Apply topically 2 times daily 15 g 1     ketoconazole (NIZORAL) 2 % shampoo Apply to the affected area and wash off after 5 minutes. Use 2-3 times a week. 120 mL 3     ketoconazole (NIZORAL) 2 % shampoo Apply to the affected area and wash off after 5 minutes.  Use 2-3 times a week.  30 day supply. 120 mL 11     lisinopril (PRINIVIL/ZESTRIL) 5 MG tablet TAKE 1 TABLET BY MOUTH DAILY 30 tablet 3     Loratadine (CLARITIN PO) Take by mouth daily Reported on 4/18/2017       prednisoLONE sodium phosphate (INFLAMASE FORTE) 1 % ophthalmic solution Place into left ear twice daily 10 mL 3     Allergies   Allergen Reactions     Dust Mites      Pollen Extract        Reviewed and updated as needed this visit by clinical staff  Tobacco  Allergies  Med Hx  Surg Hx  Fam Hx  Soc Hx      Reviewed and updated as needed this visit by Provider         ROS:  Constitutional, HEENT, cardiovascular, pulmonary, gi and gu systems are negative, except as otherwise noted.    OBJECTIVE:     /89 (BP Location: Right arm, Patient Position: Chair, Cuff Size: Adult Large)  Pulse 93  Temp 98.9  F (37.2  C) (Tympanic)  Ht 5' 6.75\" (1.695 m)  Wt 230 lb 9.6 oz (104.6 kg)  SpO2 94%  BMI 36.39 kg/m2  Body mass index is 36.39 kg/(m^2).    GENERAL: alert and no distress  EYES: Eyes grossly normal to inspection, PERRL and conjunctivae and sclerae normal  HENT: left ear canal is very scaly and somewhat red but there is no pain to tugging on the ear, right canal and TMs normal, sinuses non tender to percussion, nose and mouth without ulcers or lesions, oropharynx normal  NECK: " no adenopathy, no asymmetry, masses, or scars  RESP: lungs clear to auscultation - no rales, rhonchi or wheezes  CV: regular rate and rhythm, normal S1 S2, no S3 or S4, no murmur, click or rub      ASSESSMENT/PLAN:         1. Dermatitis of ear canal, left    I suspect her symptoms are due to a flare of the dermatitis rather than otitis externa since she is not having much pain.  Will try some steroid drops to calm symptoms.  Follow-up as needed if not improving in a week or new/worsening symptoms develop and may need to add antibiotic.     - prednisoLONE sodium phosphate (INFLAMASE FORTE) 1 % ophthalmic solution; Place into left ear twice daily  Dispense: 10 mL; Refill: 3    2. Encounter for screening mammogram for breast cancer    - *MA Screening Digital Bilateral; Future      Toni Woo MD  [facility]

## 2018-05-15 NOTE — MR AVS SNAPSHOT
"              After Visit Summary   5/15/2018    Piper Unger    MRN: [id number]           Patient Information     Date Of Birth          1970        Visit Information        Provider Department      5/15/2018 2:00 PM Toni Woo MD [facility]        Today's Diagnoses     Dermatitis of ear canal, left    -  1    Encounter for screening mammogram for breast cancer          Care Instructions    If you start to get more pain or the symptoms are not improved in a week, please let me know.           Follow-ups after your visit        Future tests that were ordered for you today     Open Future Orders        Priority Expected Expires Ordered    *MA Screening Digital Bilateral Routine  5/15/2019 5/15/2018            Who to contact     If you have questions or need follow up information about today's clinic visit or your schedule please contact [facility] directly at [phone].  Normal or non-critical lab and imaging results will be communicated to you by [employer], letter or phone within 4 business days after the clinic has received the results. If you do not hear from us within 7 days, please contact the clinic through [employer] or phone. If you have a critical or abnormal lab result, we will notify you by phone as soon as possible.  Submit refill requests through [employer] or call your pharmacy and they will forward the refill request to us. Please allow 3 business days for your refill to be completed.          Additional Information About Your Visit        MyChart Information     [employer] lets you send messages to your doctor, view your test results, renew your prescriptions, schedule appointments and more. To sign up, go to [email or web address] . Click on \"Log in\" on the left side of the screen, which will take you to the Welcome page. Then click on \"Sign up Now\" on the right side of the page.     You will be asked to enter the access code listed below, as well as some " "personal information. Please follow the directions to create your username and password.     Your access code is: [email or web address]  Expires: 6/10/2018  2:46 PM     Your access code will  in 90 days. If you need help or a new code, please call your [place] clinic or [phone].        Care EveryWhere ID     This is your Care EveryWhere ID. This could be used by other organizations to access your [place] medical records  [id number]        Your Vitals Were     Pulse Temperature Height Pulse Oximetry BMI (Body Mass Index)       93 98.9  F (37.2  C) (Tympanic) 5' 6.75\" (1.695 m) 94% 36.39 kg/m2        Blood Pressure from Last 3 Encounters:   05/15/18 151/89   03/15/18 123/76   18 140/78    Weight from Last 3 Encounters:   05/15/18 230 lb 9.6 oz (104.6 kg)   03/15/18 227 lb (103 kg)   18 227 lb 14.4 oz (103.4 kg)                 Today's Medication Changes          These changes are accurate as of 5/15/18  2:23 PM.  If you have any questions, ask your nurse or doctor.               Start taking these medicines.        Dose/Directions    prednisoLONE sodium phosphate 1 % ophthalmic solution   Commonly known as:  INFLAMASE FORTE   Used for:  Dermatitis of ear canal, left   Started by:  Toni Woo MD        Place into left ear twice daily   Quantity:  10 mL   Refills:  3            Where to get your medicines      [id number] [place] [address] [address] [address]     Phone:  [phone]     prednisoLONE sodium phosphate 1 % ophthalmic solution                Primary Care Provider Office Phone # Fax #    Tayler Whitlock [phone] [address] [facility] [address]        Equal Access to Services     JOSELO GOMEZ AH: Rasheeda Villarreal, raza valladaresqdeloris, marcia mayberry. [facility] [phone].    ATENCIÓN: Si aretha " español, tiene a navarro disposición servicios gratuitos de asistencia lingüística. Alesia bowden [phone].    We comply with applicable federal civil rights laws and [place] laws. We do not discriminate on the basis of race, color, national origin, age, disability, sex, sexual orientation, or gender identity.            Thank you!     Thank you for choosing [facility]  for your care. Our goal is always to provide you with excellent care. Hearing back from our patients is one way we can continue to improve our services. Please take a few minutes to complete the written survey that you may receive in the mail after your visit with us. Thank you!             Your Updated Medication List - Protect others around you: Learn how to safely use, store and throw away your medicines at www.disposemymeds.org.          This list is accurate as of 5/15/18  2:23 PM.  Always use your most recent med list.                   Brand Name Dispense Instructions for use Diagnosis    CLARITIN PO      Take by mouth daily Reported on 4/18/2017        hydrocortisone 25 MG Suppository    ANUSOL-HC    28 suppository    Place 1 suppository (25 mg) rectally 2 times daily as needed for hemorrhoids    Rectal bleeding       * ketoconazole 2 % shampoo    NIZORAL    120 mL    Apply to the affected area and wash off after 5 minutes. Use 2-3 times a week.    Dermatitis, seborrheic       * ketoconazole 2 % cream    NIZORAL    15 g    Apply topically 2 times daily    Dermatitis, seborrheic, Seborrheic dermatitis       * ketoconazole 2 % shampoo    NIZORAL    120 mL    Apply to the affected area and wash off after 5 minutes.  Use 2-3 times a week.  30 day supply.    Seborrheic dermatitis       lisinopril 5 MG tablet    PRINIVIL/ZESTRIL    30 tablet    TAKE 1 TABLET BY MOUTH DAILY    HTN, goal below 130/80       prednisoLONE sodium phosphate 1 % ophthalmic solution    INFLAMASE FORTE    10 mL    Place into left ear twice daily    Dermatitis of  ear canal, left       * Notice:  This list has 3 medication(s) that are the same as other medications prescribed for you. Read the directions carefully, and ask your doctor or other care provider to review them with you.

## 2018-06-11 ENCOUNTER — TELEPHONE (OUTPATIENT)
Dept: FAMILY MEDICINE | Facility: CLINIC | Age: 48
End: 2018-06-11

## 2018-06-11 NOTE — TELEPHONE ENCOUNTER
Panel Management Review      Summary:    Patient is due/failing the following:   BP CHECK    Action needed:   Patient needs nurse only appointment.    Type of outreach:    Sent letter.    Questions for provider review:    None                                                                                                                                    Krupa MONCADA CMA       Chart routed to None .

## 2018-06-11 NOTE — LETTER
June 11, 2018      Piper De La Rosa Cornel  [address] [facility] [place] [address]        Dear Piper,         During a recent visit to our clinic, your blood pressure was elevated above the target range for your health.   BP Readings from Last 3 Encounters:   05/15/18 151/89   03/15/18 123/76   03/12/18 140/78       Please schedule a free appointment for a blood pressure check with nurse in the near future.  You can call [phone] to do this.     You are also due for a mammogram, this has been ordered for you. Please call [phone] to schedule this.      Why is high blood pressure a big deal?    If blood pressure is elevated above target levels you have an increased risk of experiencing a heart attack or stroke, developing heart failure, kidney disease or other chronic diseases.    With appropriate early recognition and treatment, these health problems can be avoided in most cases.  Your physician can help you determine the need for treatment and discuss the non-medication and medication routes to treating high blood pressure as well as evaluate you for possible causes and effects of high blood pressure.    The target range for ideal blood pressure control is less than 140/90 for most individuals.  For those who have been diagnosed with heart disease, diabetes, stroke or peripheral vascular disease this target range is less than 130/80.    Sincerely,        Tayler Whitlock MD

## 2018-07-22 ENCOUNTER — HOSPITAL ENCOUNTER (EMERGENCY)
Facility: CLINIC | Age: 48
Discharge: HOME | End: 2018-07-22
Attending: EMERGENCY MEDICINE | Admitting: EMERGENCY MEDICINE
Payer: COMMERCIAL

## 2018-07-22 ENCOUNTER — APPOINTMENT (OUTPATIENT)
Dept: GENERAL RADIOLOGY | Facility: CLINIC | Age: 48
End: 2018-07-22
Attending: EMERGENCY MEDICINE
Payer: COMMERCIAL

## 2018-07-22 VITALS
SYSTOLIC BLOOD PRESSURE: 146 MMHG | OXYGEN SATURATION: 93 % | RESPIRATION RATE: 16 BRPM | DIASTOLIC BLOOD PRESSURE: 89 MMHG | TEMPERATURE: 98.2 F

## 2018-07-22 DIAGNOSIS — J06.9 UPPER RESPIRATORY TRACT INFECTION, UNSPECIFIED TYPE: ICD-10-CM

## 2018-07-22 DIAGNOSIS — R07.9 CHEST PAIN, UNSPECIFIED TYPE: ICD-10-CM

## 2018-07-22 LAB
ANION GAP SERPL CALCULATED.3IONS-SCNC: 7 MMOL/L (ref 3–14)
BASOPHILS # BLD AUTO: 0 10E9/L (ref 0–0.2)
BASOPHILS NFR BLD AUTO: 0.6 %
BUN SERPL-MCNC: 11 MG/DL (ref 7–30)
CALCIUM SERPL-MCNC: 8.5 MG/DL (ref 8.5–10.1)
CHLORIDE SERPL-SCNC: 108 MMOL/L (ref 94–109)
CO2 SERPL-SCNC: 25 MMOL/L (ref 20–32)
CREAT SERPL-MCNC: 0.7 MG/DL (ref 0.52–1.04)
DIFFERENTIAL METHOD BLD: NORMAL
EOSINOPHIL # BLD AUTO: 0.1 10E9/L (ref 0–0.7)
EOSINOPHIL NFR BLD AUTO: 1.7 %
ERYTHROCYTE [DISTWIDTH] IN BLOOD BY AUTOMATED COUNT: 13.7 % (ref 10–15)
GFR SERPL CREATININE-BSD FRML MDRD: 88 ML/MIN/1.7M2
GLUCOSE SERPL-MCNC: 99 MG/DL (ref 70–99)
HCT VFR BLD AUTO: 40.6 % (ref 35–47)
HGB BLD-MCNC: 13.4 G/DL (ref 11.7–15.7)
IMM GRANULOCYTES # BLD: 0 10E9/L (ref 0–0.4)
IMM GRANULOCYTES NFR BLD: 0.4 %
LYMPHOCYTES # BLD AUTO: 1.3 10E9/L (ref 0.8–5.3)
LYMPHOCYTES NFR BLD AUTO: 26 %
MCH RBC QN AUTO: 28.3 PG (ref 26.5–33)
MCHC RBC AUTO-ENTMCNC: 33 G/DL (ref 31.5–36.5)
MCV RBC AUTO: 86 FL (ref 78–100)
MONOCYTES # BLD AUTO: 0.4 10E9/L (ref 0–1.3)
MONOCYTES NFR BLD AUTO: 7.8 %
NEUTROPHILS # BLD AUTO: 3.3 10E9/L (ref 1.6–8.3)
NEUTROPHILS NFR BLD AUTO: 63.5 %
NRBC # BLD AUTO: 0 10*3/UL
NRBC BLD AUTO-RTO: 0 /100
PLATELET # BLD AUTO: 332 10E9/L (ref 150–450)
POTASSIUM SERPL-SCNC: 3.9 MMOL/L (ref 3.4–5.3)
RBC # BLD AUTO: 4.74 10E12/L (ref 3.8–5.2)
SODIUM SERPL-SCNC: 140 MMOL/L (ref 133–144)
TROPONIN I SERPL-MCNC: <0.015 UG/L (ref 0–0.04)
WBC # BLD AUTO: 5.2 10E9/L (ref 4–11)

## 2018-07-22 PROCEDURE — 84484 ASSAY OF TROPONIN QUANT: CPT | Performed by: EMERGENCY MEDICINE

## 2018-07-22 PROCEDURE — 71046 X-RAY EXAM CHEST 2 VIEWS: CPT

## 2018-07-22 PROCEDURE — 85025 COMPLETE CBC W/AUTO DIFF WBC: CPT | Performed by: EMERGENCY MEDICINE

## 2018-07-22 PROCEDURE — 93010 ELECTROCARDIOGRAM REPORT: CPT | Mod: Z6 | Performed by: EMERGENCY MEDICINE

## 2018-07-22 PROCEDURE — 80048 BASIC METABOLIC PNL TOTAL CA: CPT | Performed by: EMERGENCY MEDICINE

## 2018-07-22 PROCEDURE — 99285 EMERGENCY DEPT VISIT HI MDM: CPT | Mod: 25 | Performed by: EMERGENCY MEDICINE

## 2018-07-22 PROCEDURE — 93005 ELECTROCARDIOGRAM TRACING: CPT | Performed by: EMERGENCY MEDICINE

## 2018-07-22 NOTE — ED AVS SNAPSHOT
[facility]    [address] [facility] [address]    Phone:  [phone]    Fax:  [phone]                                       Piper Unger   MRN: [id number]    Department:  [facility]   Date of Visit:  7/22/2018           After Visit Summary Signature Page     I have received my discharge instructions, and my questions have been answered. I have discussed any challenges I see with this plan with the nurse or doctor.    ..........................................................................................................................................  Patient/Patient Representative Signature      ..........................................................................................................................................  Patient Representative Print Name and Relationship to Patient    ..................................................               ................................................  Date                                            Time    ..........................................................................................................................................  Reviewed by Signature/Title    ...................................................              ..............................................  Date                                                            Time

## 2018-07-22 NOTE — DISCHARGE INSTRUCTIONS
*CHEST PAIN, UNCERTAIN CAUSE    Based on your exam today, the exact cause of your chest pain is not certain. Your condition does not seem serious at this time, and your pain does not appear to be coming from your heart. However, sometimes the signs of a serious problem take more time to appear. Therefore, watch for the warning signs listed below.  HOME CARE:    1. Rest today and avoid strenuous activity.  2. Take any prescribed medicine as directed.  FOLLOW UP with your doctor in 1-3 days.   GET PROMPT MEDICAL ATTENTION if any of the following occur:    A change in the type of pain: if it feels different, becomes more severe, lasts longer, or begins to spread into your shoulder, arm, neck, jaw or back    Shortness of breath or increased pain with breathing    Weakness, dizziness, or fainting    Cough with blood or dark colored sputum (phlegm)    Fever over 101  F (38.3  C)    Swelling, pain or redness in one leg    0798-9033 [employer]. [address] [place] [address]. All rights reserved. This information is not intended as a substitute for professional medical care. Always follow your healthcare professional's instructions.  This information has been modified by your health care provider with permission from the publisher.      Viral Upper Respiratory Illness (Adult)  You have a viral upper respiratory illness (URI), which is another term for the common cold. This illness is contagious during the first few days. It is spread through the air by coughing and sneezing. It may also be spread by direct contact (touching the sick person and then touching your own eyes, nose, or mouth). Frequent handwashing will decrease risk of spread. Most viral illnesses go away within 7 to 10 days with rest and simple home remedies. Sometimes the illness may last for several weeks. Antibiotics will not kill a virus, and they are generally not prescribed for this condition.    Home care    If symptoms are  severe, rest at home for the first 2 to 3 days. When you resume activity, don't let yourself get too tired.    Avoid being exposed to cigarette smoke (yours or others ).    You may use acetaminophen or ibuprofen to control pain and fever, unless another medicine was prescribed. If you have chronic liver or kidney disease, have ever had a stomach ulcer or gastrointestinal bleeding, or are taking blood-thinning medicines, talk with your healthcare provider before using these medicines. Aspirin should never be given to anyone under 18 years of age who is ill with a viral infection or fever. It may cause severe liver or brain damage.    Your appetite may be poor, so a light diet is fine. Avoid dehydration by drinking 6 to 8 glasses of fluids per day (water, soft drinks, juices, tea, or soup). Extra fluids will help loosen secretions in the nose and lungs.    Over-the-counter cold medicines will not shorten the length of time you re sick, but they may be helpful for the following symptoms: cough, sore throat, and nasal and sinus congestion. (Note: Do not use decongestants if you have high blood pressure.)  Follow-up care  Follow up with your healthcare provider, or as advised.  When to seek medical advice  Call your healthcare provider right away if any of these occur:    Cough with lots of colored sputum (mucus)    Severe headache; face, neck, or ear pain    Difficulty swallowing due to throat pain    Fever of 100.4 F (38 C) or higher, or as directed by your healthcare provider  Call 911  Call 911 if any of these occur:    Chest pain, shortness of breath, wheezing, or difficulty breathing    Coughing up blood    Inability to swallow due to throat pain  Date Last Reviewed: 9/13/2015 2000-2017 [employer]. [address] [place] [id number]. All rights reserved. This information is not intended as a substitute for professional medical care. Always follow your healthcare professional's  instructions.

## 2018-07-22 NOTE — ED AVS SNAPSHOT
[facility]    [address] [facility] [address]    Phone:  [phone]    Fax:  [phone]                                       Piper Unger   MRN: [id number]    Department:  [facility]   Date of Visit:  7/22/2018           Patient Information     Date Of Birth          1970        Your diagnoses for this visit were:     Upper respiratory tract infection, unspecified type     Chest pain, unspecified type        You were seen by Liang Lucas MD.      Follow-up Information     Follow up with Tayler Whitlock MD.    Specialty:  Family Practice    Contact information:    [address] [facility] [address]  [phone]          Discharge Instructions          *CHEST PAIN, UNCERTAIN CAUSE    Based on your exam today, the exact cause of your chest pain is not certain. Your condition does not seem serious at this time, and your pain does not appear to be coming from your heart. However, sometimes the signs of a serious problem take more time to appear. Therefore, watch for the warning signs listed below.  HOME CARE:    1. Rest today and avoid strenuous activity.  2. Take any prescribed medicine as directed.  FOLLOW UP with your doctor in 1-3 days.   GET PROMPT MEDICAL ATTENTION if any of the following occur:    A change in the type of pain: if it feels different, becomes more severe, lasts longer, or begins to spread into your shoulder, arm, neck, jaw or back    Shortness of breath or increased pain with breathing    Weakness, dizziness, or fainting    Cough with blood or dark colored sputum (phlegm)    Fever over 101  F (38.3  C)    Swelling, pain or redness in one leg    0834-2303 [employer]. [address] [place] [address]. All rights reserved. This information is not intended as a substitute for professional medical care. Always follow your healthcare professional's instructions.  This information has been  modified by your health care provider with permission from the publisher.      Viral Upper Respiratory Illness (Adult)  You have a viral upper respiratory illness (URI), which is another term for the common cold. This illness is contagious during the first few days. It is spread through the air by coughing and sneezing. It may also be spread by direct contact (touching the sick person and then touching your own eyes, nose, or mouth). Frequent handwashing will decrease risk of spread. Most viral illnesses go away within 7 to 10 days with rest and simple home remedies. Sometimes the illness may last for several weeks. Antibiotics will not kill a virus, and they are generally not prescribed for this condition.    Home care    If symptoms are severe, rest at home for the first 2 to 3 days. When you resume activity, don't let yourself get too tired.    Avoid being exposed to cigarette smoke (yours or others ).    You may use acetaminophen or ibuprofen to control pain and fever, unless another medicine was prescribed. If you have chronic liver or kidney disease, have ever had a stomach ulcer or gastrointestinal bleeding, or are taking blood-thinning medicines, talk with your healthcare provider before using these medicines. Aspirin should never be given to anyone under 18 years of age who is ill with a viral infection or fever. It may cause severe liver or brain damage.    Your appetite may be poor, so a light diet is fine. Avoid dehydration by drinking 6 to 8 glasses of fluids per day (water, soft drinks, juices, tea, or soup). Extra fluids will help loosen secretions in the nose and lungs.    Over-the-counter cold medicines will not shorten the length of time you re sick, but they may be helpful for the following symptoms: cough, sore throat, and nasal and sinus congestion. (Note: Do not use decongestants if you have high blood pressure.)  Follow-up care  Follow up with your healthcare provider, or as advised.  When to  seek medical advice  Call your healthcare provider right away if any of these occur:    Cough with lots of colored sputum (mucus)    Severe headache; face, neck, or ear pain    Difficulty swallowing due to throat pain    Fever of 100.4 F (38 C) or higher, or as directed by your healthcare provider  Call 911  Call 911 if any of these occur:    Chest pain, shortness of breath, wheezing, or difficulty breathing    Coughing up blood    Inability to swallow due to throat pain  Date Last Reviewed: 9/13/2015 2000-2017 [employer]. [address] [place] [address]. All rights reserved. This information is not intended as a substitute for professional medical care. Always follow your healthcare professional's instructions.          24 Hour Appointment Hotline       To make an appointment at any [facility], call [phone] ([phone]). If you don't have a family doctor or clinic, we will help you find one. [place] clinics are conveniently located to serve the needs of you and your family.             Review of your medicines      Our records show that you are taking the medicines listed below. If these are incorrect, please call your family doctor or clinic.        Dose / Directions Last dose taken    CLARITIN PO        Take by mouth daily Reported on 4/18/2017   Refills:  0        hydrocortisone 25 MG Suppository   Commonly known as:  ANUSOL-HC   Dose:  25 mg   Quantity:  28 suppository        Place 1 suppository (25 mg) rectally 2 times daily as needed for hemorrhoids   Refills:  0        * ketoconazole 2 % shampoo   Commonly known as:  NIZORAL   Quantity:  120 mL        Apply to the affected area and wash off after 5 minutes. Use 2-3 times a week.   Refills:  3        * ketoconazole 2 % cream   Commonly known as:  NIZORAL   Quantity:  15 g        Apply topically 2 times daily   Refills:  1        * ketoconazole 2 % shampoo   Commonly known as:  NIZORAL   Quantity:  120 mL         Apply to the affected area and wash off after 5 minutes.  Use 2-3 times a week.  30 day supply.   Refills:  11        lisinopril 5 MG tablet   Commonly known as:  PRINIVIL/ZESTRIL   Quantity:  30 tablet        TAKE 1 TABLET BY MOUTH DAILY   Refills:  3        prednisoLONE sodium phosphate 1 % ophthalmic solution   Commonly known as:  INFLAMASE FORTE   Quantity:  10 mL        Place into left ear twice daily   Refills:  3        * Notice:  This list has 3 medication(s) that are the same as other medications prescribed for you. Read the directions carefully, and ask your doctor or other care provider to review them with you.            Procedures and tests performed during your visit     Basic metabolic panel    CBC with platelets differential    EKG 12-lead, tracing only    Troponin I    XR Chest 2 Views      Orders Needing Specimen Collection     None      Pending Results     Date and Time Order Name Status Description    7/22/2018 1531 XR Chest 2 Views Preliminary             Pending Culture Results     No orders found from 7/20/2018 to 7/23/2018.            Pending Results Instructions     If you had any lab results that were not finalized at the time of your Discharge, you can call the ED Lab Result RN at [phone]. You will be contacted by this team for any positive Lab results or changes in treatment. The nurses are available 7 days a week from 10A to 6:30P.  You can leave a message 24 hours per day and they will return your call.        Test Results From Your Hospital Stay        7/22/2018  3:51 PM      Component Results     Component Value Ref Range & Units Status    WBC 5.2 4.0 - 11.0 10e9/L Final    RBC Count 4.74 3.8 - 5.2 10e12/L Final    Hemoglobin 13.4 11.7 - 15.7 g/dL Final    Hematocrit 40.6 35.0 - 47.0 % Final    MCV 86 78 - 100 fl Final    MCH 28.3 26.5 - 33.0 pg Final    MCHC 33.0 31.5 - 36.5 g/dL Final    RDW 13.7 10.0 - 15.0 % Final    Platelet Count 332 150 - 450 10e9/L Final    Diff Method  Automated Method  Final    % Neutrophils 63.5 % Final    % Lymphocytes 26.0 % Final    % Monocytes 7.8 % Final    % Eosinophils 1.7 % Final    % Basophils 0.6 % Final    % Immature Granulocytes 0.4 % Final    Nucleated RBCs 0 0 /100 Final    Absolute Neutrophil 3.3 1.6 - 8.3 10e9/L Final    Absolute Lymphocytes 1.3 0.8 - 5.3 10e9/L Final    Absolute Monocytes 0.4 0.0 - 1.3 10e9/L Final    Absolute Eosinophils 0.1 0.0 - 0.7 10e9/L Final    Absolute Basophils 0.0 0.0 - 0.2 10e9/L Final    Abs Immature Granulocytes 0.0 0 - 0.4 10e9/L Final    Absolute Nucleated RBC 0.0  Final         7/22/2018  4:11 PM      Component Results     Component Value Ref Range & Units Status    Sodium 140 133 - 144 mmol/L Final    Potassium 3.9 3.4 - 5.3 mmol/L Final    Chloride 108 94 - 109 mmol/L Final    Carbon Dioxide 25 20 - 32 mmol/L Final    Anion Gap 7 3 - 14 mmol/L Final    Glucose 99 70 - 99 mg/dL Final    Urea Nitrogen 11 7 - 30 mg/dL Final    Creatinine 0.70 0.52 - 1.04 mg/dL Final    GFR Estimate 88 >60 mL/min/1.7m2 Final    Non  GFR Calc    GFR Estimate If Black >90 >60 mL/min/1.7m2 Final    African American GFR Calc    Calcium 8.5 8.5 - 10.1 mg/dL Final         7/22/2018  4:11 PM      Component Results     Component Value Ref Range & Units Status    Troponin I ES <0.015 0.000 - 0.045 ug/L Final    The 99th percentile for upper reference range is 0.045 ug/L.  Troponin values   in the range of 0.045 - 0.120 ug/L may be associated with risks of adverse   clinical events.           7/22/2018  4:16 PM      Narrative     CHEST TWO VIEWS  7/22/2018 4:03 PM     HISTORY: Cough.      COMPARISON: 9/29/2012.        Impression     IMPRESSION: Mild cardiomegaly is present. There is no evidence for  congestive heart failure or any infiltrates.                 Thank you for choosing [facility]       Thank you for choosing [facility] for your care. Our goal is always to provide you with excellent care. Hearing back from our  "patients is one way we can continue to improve our services. Please take a few minutes to complete the written survey that you may receive in the mail after you visit with us. Thank you!        [employer] Information     [employer] lets you send messages to your doctor, view your test results, renew your prescriptions, schedule appointments and more. To sign up, go to [email or web address] . Click on \"Log in\" on the left side of the screen, which will take you to the Welcome page. Then click on \"Sign up Now\" on the right side of the page.     You will be asked to enter the access code listed below, as well as some personal information. Please follow the directions to create your username and password.     Your access code is: [email or web address]  Expires: 10/20/2018  4:25 PM     Your access code will  in 90 days. If you need help or a new code, please call your [place] clinic or [phone].        Care EveryWhere ID     This is your Care EveryWhere ID. This could be used by other organizations to access your [place] medical records  [id number]        Equal Access to Services     JOSELO GOMEZ : Hadcalderon Villarreal, raza dotson, gerry bryson, marcia holland . So [facility] [phone].    ATENCIÓN: Si habla español, tiene a navarro disposición servicios gratuitos de asistencia lingüística. Alesia al [phone].    We comply with applicable federal civil rights laws and [place] laws. We do not discriminate on the basis of race, color, national origin, age, disability, sex, sexual orientation, or gender identity.            After Visit Summary       This is your record. Keep this with you and show to your community pharmacist(s) and doctor(s) at your next visit.                  "

## 2018-07-22 NOTE — ED PROVIDER NOTES
History     Chief Complaint   Patient presents with     Hypertension     Pt feels like blood pressure is high.  Arms feel tight.  Ears and throat burning.  Coughing a lot.  Chest tight, not sure if it is from coughing.       HPI  Piper Unger is a 48 year old female with history of hypertension, hypothyroidism, hyperlipidemia who presents to the ED with hypertension concerns. The patient reports today she feel like her blood pressure is high, she has been having cough fits, left arm tightness, chest tightness, left ear pain, and throat burning. She reports she has chronic left ear pain and has a prescription for steroid cream for it. She reports she thinks that her ear pain is making her throat burn. The patient reports she has been fatigued for about one week. She reports taking lisinopril 5MG last night and today, along with a baby aspirin two hours before assessment. The patient reports she is allergic to dust and pollen so taking a daily Claritin. The patient has a cat at home she has had for a longtime. The patient denies being around anyone sick and states she stays at home during the summer. The patient reports she has a chronic cough but denies having asthma. The patient reports she has regular check-ups for her hypertension. The patient reports she family has a history of heart problems, with both her sister and brother with past problems, but she is unsure of the specific heart problem they had. The patient denies urinary problems, rash, tobacco, alcohol, and illicit drug use. She reports diarrhea consisting of watery and loose stools, though this is not unusual for her.     Patient Active Problem List   Diagnosis     Allergic rhinitis due to other allergen     Hypothyroidism     Excessive sweating     HYPERLIPIDEMIA LDL GOAL <130     Obesity (BMI 30-39.9)     Benign essential hypertension     Current Outpatient Prescriptions   Medication Sig Dispense Refill     hydrocortisone (ANUSOL-HC) 25 MG  Suppository Place 1 suppository (25 mg) rectally 2 times daily as needed for hemorrhoids 28 suppository 0     ketoconazole (NIZORAL) 2 % cream Apply topically 2 times daily 15 g 1     ketoconazole (NIZORAL) 2 % shampoo Apply to the affected area and wash off after 5 minutes.  Use 2-3 times a week.  30 day supply. 120 mL 11     ketoconazole (NIZORAL) 2 % shampoo Apply to the affected area and wash off after 5 minutes. Use 2-3 times a week. 120 mL 3     lisinopril (PRINIVIL/ZESTRIL) 5 MG tablet TAKE 1 TABLET BY MOUTH DAILY 30 tablet 3     Loratadine (CLARITIN PO) Take by mouth daily Reported on 4/18/2017       prednisoLONE sodium phosphate (INFLAMASE FORTE) 1 % ophthalmic solution Place into left ear twice daily 10 mL 3     Allergies   Allergen Reactions     Dust Mites      Pollen Extract      Problem List:    Patient Active Problem List    Diagnosis Date Noted     Benign essential hypertension 04/12/2017     Priority: Medium     Obesity (BMI 30-39.9) 11/14/2011     Priority: Medium     BMI 33       HYPERLIPIDEMIA LDL GOAL <130 10/31/2010     Priority: Medium     Excessive sweating 08/04/2010     Priority: Medium     Diagnosis updated by automated process. Provider to review and confirm.       Hypothyroidism 10/01/2009     Priority: Medium     Allergic rhinitis due to other allergen      Priority: Medium     seasonal allergies          Past Medical History:    Past Medical History:   Diagnosis Date     Allergic rhinitis due to other allergen      Irregular menstrual cycle      Other congenital anomaly of uterus      Pure hypercholesterolemia        Past Surgical History:    Past Surgical History:   Procedure Laterality Date     COLONOSCOPY N/A 3/15/2018    Procedure: COLONOSCOPY;  colonoscopy;  Surgeon: Shahzad Villafana MD;  Location: [facility]     NO HISTORY OF SURGERY         Family History:    Family History   Problem Relation Age of Onset     Other - See Comments Father      fluid in lungs     Hypertension Father       Hypertension Sister      Hypertension Brother      Crohn Disease Son      Allergies Son      eczma     Lipids Sister        Social History:  Marital Status:   [2]  Social History   Substance Use Topics     Smoking status: Never Smoker     Smokeless tobacco: Never Used     Alcohol use Yes      Comment: occasional        Medications:      hydrocortisone (ANUSOL-HC) 25 MG Suppository   ketoconazole (NIZORAL) 2 % cream   ketoconazole (NIZORAL) 2 % shampoo   ketoconazole (NIZORAL) 2 % shampoo   lisinopril (PRINIVIL/ZESTRIL) 5 MG tablet   Loratadine (CLARITIN PO)   prednisoLONE sodium phosphate (INFLAMASE FORTE) 1 % ophthalmic solution         Review of Systems   All other systems are reviewed and are negative.      Physical Exam   BP: (!) 153/97  Heart Rate: 95  Temp: 98.2  F (36.8  C)  Resp: 16  SpO2: 98 %      Physical Exam  Nontoxic appearing no respiratory distress alert and oriented ×3  Head atraumatic normocephalic  TMs/EACs unremarkable, scaling and mild erythema left external ear, conjunctiva noninjected, oropharynx moist without lesions or erythema  No cervical adenopathy   Neck supple full active painless range of motion  Lungs clear to auscultation  Heart regular no murmur  Abdomen soft nontender bowel sounds positive no masses or HSM  Strength and sensation grossly intact throughout the extremities, gait and station normal  Speech is fluent, good eye contact, thought processes are rational  Lower extremities without swelling, redness or tenderness  Pedal pulses symmetrical and strong    ED Course     ED Course     Procedures             EKG normal sinus rhythm, axis intervals normal, no acute ST-T wave changes, 1 PVC read by Dr. Liang Lucas  Critical Care time:  none     Results for orders placed or performed during the hospital encounter of 07/22/18   XR Chest 2 Views    Narrative    CHEST TWO VIEWS  7/22/2018 4:03 PM     HISTORY: Cough.      COMPARISON: 9/29/2012.      Impression    IMPRESSION:  Mild cardiomegaly is present. There is no evidence for  congestive heart failure or any infiltrates.     RICARDO VAUGHAN MD   CBC with platelets differential   Result Value Ref Range    WBC 5.2 4.0 - 11.0 10e9/L    RBC Count 4.74 3.8 - 5.2 10e12/L    Hemoglobin 13.4 11.7 - 15.7 g/dL    Hematocrit 40.6 35.0 - 47.0 %    MCV 86 78 - 100 fl    MCH 28.3 26.5 - 33.0 pg    MCHC 33.0 31.5 - 36.5 g/dL    RDW 13.7 10.0 - 15.0 %    Platelet Count 332 150 - 450 10e9/L    Diff Method Automated Method     % Neutrophils 63.5 %    % Lymphocytes 26.0 %    % Monocytes 7.8 %    % Eosinophils 1.7 %    % Basophils 0.6 %    % Immature Granulocytes 0.4 %    Nucleated RBCs 0 0 /100    Absolute Neutrophil 3.3 1.6 - 8.3 10e9/L    Absolute Lymphocytes 1.3 0.8 - 5.3 10e9/L    Absolute Monocytes 0.4 0.0 - 1.3 10e9/L    Absolute Eosinophils 0.1 0.0 - 0.7 10e9/L    Absolute Basophils 0.0 0.0 - 0.2 10e9/L    Abs Immature Granulocytes 0.0 0 - 0.4 10e9/L    Absolute Nucleated RBC 0.0    Basic metabolic panel   Result Value Ref Range    Sodium 140 133 - 144 mmol/L    Potassium 3.9 3.4 - 5.3 mmol/L    Chloride 108 94 - 109 mmol/L    Carbon Dioxide 25 20 - 32 mmol/L    Anion Gap 7 3 - 14 mmol/L    Glucose 99 70 - 99 mg/dL    Urea Nitrogen 11 7 - 30 mg/dL    Creatinine 0.70 0.52 - 1.04 mg/dL    GFR Estimate 88 >60 mL/min/1.7m2    GFR Estimate If Black >90 >60 mL/min/1.7m2    Calcium 8.5 8.5 - 10.1 mg/dL   Troponin I   Result Value Ref Range    Troponin I ES <0.015 0.000 - 0.045 ug/L                 No results found for this or any previous visit (from the past 24 hour(s)).    Medications - No data to display     2:50 PM Patient Assessed.         I have reviewed the nursing notes.  Assessments & Plan (with Medical Decision Making)  48-year-old female presents with cough, some chest tightness, ECG is unremarkable for acute change, no risk for DVT/PE, troponin normal, remainder workup unremarkable, chest x-ray by my read is unremarkable, by radiology mild  cardiomegaly.  No indication for further evaluation.  Etiology of chest discomfort remains undetermined, usual differential considered.  Follow-up primary care.  Return criteria reviewed  I have reviewed the findings, diagnosis, plan and need for follow up with the patient.       Discharge Medication List as of 7/22/2018  4:25 PM          Final diagnoses:   Upper respiratory tract infection, unspecified type   Chest pain, unspecified type     This document serves as a record of the services and decisions personally performed and made by Liang Lucas MD. It was created on HIS/HER behalf by   Niki Valiente, a trained medical scribe. The creation of this document is based the provider's statements to the medical scribe.  Niki Valiente 2:50 PM 7/22/2018    Provider:   The information in this document, created by the medical scribe for me, accurately reflects the services I personally performed and the decisions made by me. I have reviewed and approved this document for accuracy prior to leaving the patient care area.  Liang Lucas MD 2:50 PM 7/22/2018 7/22/2018   [facility] EMERGENCY DEPARTMENT     Liang Lucas MD  07/25/18 0720

## 2018-08-14 DIAGNOSIS — I10 HTN, GOAL BELOW 130/80: ICD-10-CM

## 2018-08-14 RX ORDER — LISINOPRIL 5 MG/1
TABLET ORAL
Qty: 90 TABLET | Refills: 3 | Status: SHIPPED | OUTPATIENT
Start: 2018-08-14 | End: 2019-08-21

## 2018-08-14 NOTE — TELEPHONE ENCOUNTER
"Requested Prescriptions   Pending Prescriptions Disp Refills     lisinopril (PRINIVIL/ZESTRIL) 5 MG tablet [Pharmacy Med Name: LISINOPRIL 5MG TAB] 30 tablet      Sig: TAKE 1 TABLET BY MOUTH DAILY    ACE Inhibitors (Including Combos) Protocol Failed    8/14/2018  1:06 AM       Failed - Blood pressure under 140/90 in past 12 months    BP Readings from Last 3 Encounters:   07/22/18 146/89   05/15/18 151/89   03/15/18 123/76                Passed - Recent (12 mo) or future (30 days) visit within the authorizing provider's specialty    Patient had office visit in the last 12 months or has a visit in the next 30 days with authorizing provider or within the authorizing provider's specialty.  See \"Patient Info\" tab in inbasket, or \"Choose Columns\" in Meds & Orders section of the refill encounter.           Passed - Patient is age 18 or older       Passed - No active pregnancy on record       Passed - Normal serum creatinine on file in past 12 months    Recent Labs   Lab Test  07/22/18   1542   CR  0.70            Passed - Normal serum potassium on file in past 12 months    Recent Labs   Lab Test  07/22/18   1542   POTASSIUM  3.9            Passed - No positive pregnancy test in past 12 months        Last Written Prescription Date:  8/14/18  Last Fill Quantity: 30,  # refills: 0   Last office visit: 5/15/2018 with prescribing provider:  Chilo   Future Office Visit:      "

## 2018-08-14 NOTE — TELEPHONE ENCOUNTER
Routing refill request to provider for review/approval because:  Blood pressure is above goal.    Thank you  Azeb ADAMS RN

## 2018-08-23 ENCOUNTER — HOSPITAL ENCOUNTER (OUTPATIENT)
Dept: MAMMOGRAPHY | Facility: CLINIC | Age: 48
Discharge: HOME | End: 2018-08-23
Attending: INTERNAL MEDICINE | Admitting: INTERNAL MEDICINE
Payer: COMMERCIAL

## 2018-08-23 DIAGNOSIS — Z12.31 ENCOUNTER FOR SCREENING MAMMOGRAM FOR BREAST CANCER: ICD-10-CM

## 2018-08-23 PROCEDURE — 77067 SCR MAMMO BI INCL CAD: CPT

## 2019-03-13 ENCOUNTER — OFFICE VISIT (OUTPATIENT)
Dept: FAMILY MEDICINE | Facility: CLINIC | Age: 49
End: 2019-03-13
Payer: COMMERCIAL

## 2019-03-13 VITALS
TEMPERATURE: 98.6 F | DIASTOLIC BLOOD PRESSURE: 80 MMHG | SYSTOLIC BLOOD PRESSURE: 122 MMHG | HEART RATE: 94 BPM | OXYGEN SATURATION: 95 %

## 2019-03-13 DIAGNOSIS — H60.392 INFECTIVE OTITIS EXTERNA, LEFT: Primary | ICD-10-CM

## 2019-03-13 PROCEDURE — 99213 OFFICE O/P EST LOW 20 MIN: CPT | Performed by: FAMILY MEDICINE

## 2019-03-13 RX ORDER — HYDROCORTISONE AND ACETIC ACID 20.75; 10.375 MG/ML; MG/ML
4 SOLUTION AURICULAR (OTIC) 2 TIMES DAILY
Qty: 10 ML | Refills: 11 | Status: SHIPPED | OUTPATIENT
Start: 2019-03-20 | End: 2019-10-07

## 2019-03-13 RX ORDER — NEOMYCIN SULFATE, POLYMYXIN B SULFATE, HYDROCORTISONE 3.5; 10000; 1 MG/ML; [USP'U]/ML; MG/ML
3 SOLUTION/ DROPS AURICULAR (OTIC) 4 TIMES DAILY
Qty: 10 ML | Refills: 0 | Status: SHIPPED | OUTPATIENT
Start: 2019-03-13 | End: 2019-03-20

## 2019-03-13 NOTE — PATIENT INSTRUCTIONS
Use the antibiotic pluc hydrocotirone drops for 7 days to treat the otitis externa, after that use the VolSol (acetic acid -drying agent + hydrocortisone - calms the dermatitis) 2 times a day for 2 weeks then once a  Day for 2 weekss then stop and you may need to restart this process from time to time with flares.    If hearing not improving then see the ENT specialist Dr. Maria.  Or see dermatology specialist if the skin irritation is not calming down.    Patient Education     External Ear Infection (Adult)    External otitis (also called  swimmer s ear ) is an infection in the ear canal. It is often caused by bacteria or fungus. It can occur a few days after water gets trapped in the ear canal (from swimming or bathing). It can also occur after cleaning too deeply in the ear canal with a cotton swab or other object. Sometimes, hair care products get into the ear canal and cause this problem.  Symptoms can include pain, fever, itching, redness, drainage, or swelling of the ear canal. Temporary hearing loss may also occur.  Home care    Do not try to clean the ear canal. This can push pus and bacteria deeper into the canal.    Use prescribed ear drops as directed. These help reduce swelling and fight the infection. If an ear wick was placed in the ear canal, apply drops right onto the end of the wick. The wick will draw the medicine into the ear canal even if it is swollen closed.    A cotton ball may be loosely placed in the outer ear to absorb any drainage.    You may use acetaminophen or ibuprofen to control pain, unless another medicine was prescribed. Note: If you have chronic liver or kidney disease or ever had a stomach ulcer or GI bleeding, talk to your healthcare provider before taking any of these medicines.    Do not allow water to get into your ear when bathing. Also, don't swim until the infection has cleared.  Prevention    Keep your ears dry. This helps lower the risk of infection. Dry your ears with a  towel or hair dryer after getting wet. Also, use ear plugs when swimming.    Do not stick any objects in the ear to remove wax.    If you feel water trapped in your ear, use ear drops right away. You can get these drops over the counter at most drugstores. They work by removing water from the ear canal.  Follow-up care  Follow up with your healthcare provider in 1 week, or as advised.  When to seek medical advice  Call your healthcare provider right away if any of these occur:    Ear pain becomes worse or doesn t improve after 3 days of treatment    Redness or swelling of the outer ear occurs or gets worse    Headache    Painful or stiff neck    Drowsiness or confusion    Fever of 100.4 F (38 C) or higher, or as directed by your healthcare provider    Seizure  Date Last Reviewed: 10/1/2017    0681-0731 [employer]. [address] [place] [address]. All rights reserved. This information is not intended as a substitute for professional medical care. Always follow your healthcare professional's instructions.

## 2019-03-13 NOTE — PROGRESS NOTES
SUBJECTIVE:   Piper Unger is a 48 year old female who presents to clinic today for the following health issues:      Pt saw a specialist (Derm and ENT)  Both ears, left its worse - sometimes leaks and there is a odor  Itches - other times is very dry, today woke up with a headache and a burning sensation.  Pt also concern about her hearing abilities.     Acute Illness   Acute illness concerns: ear infeciton  Onset: follow up    Fever: no    Chills/Sweats: no    Headache (location?): YES    Sinus Pressure:no    Conjunctivitis:  no    Ear Pain: YES: both    Rhinorrhea: no    Congestion: no    Sore Throat: no     Cough: no    Wheeze: no    Decreased Appetite: no    Nausea: no    Vomiting: no    Diarrhea:  no    Dysuria/Freq.: no    Fatigue/Achiness: no    Sick/Strep Exposure: no     Therapies Tried and outcome: na    A year ago was prescribed prednisolone 1% which helped decrease the itching.  Trying to keep ears from getting wet with putting cotton ball on outside.      Problem list and histories reviewed & adjusted, as indicated.  Additional history: as documented    BP Readings from Last 3 Encounters:   03/13/19 122/80   07/22/18 146/89   05/15/18 151/89    Wt Readings from Last 3 Encounters:   05/15/18 104.6 kg (230 lb 9.6 oz)   03/15/18 103 kg (227 lb)   03/12/18 103.4 kg (227 lb 14.4 oz)                    Reviewed and updated as needed this visit by clinical staff       Reviewed and updated as needed this visit by Provider         ROS:  Constitutional, HEENT, cardiovascular, pulmonary, gi and gu systems are negative, except as otherwise noted.    OBJECTIVE:     /80   Pulse 94   Temp 98.6  F (37  C) (Tympanic)   SpO2 95%   Breastfeeding? No   There is no height or weight on file to calculate BMI.  GENERAL: healthy, alert and no distress  HENT: normal cephalic/atraumatic, right ear: normal: no effusions, no erythema, normal landmarks, left ear: normal TM mobility on insufflation and dry skin  irritation in the ear pinnae and external canal,  oropharynx clear and oral mucous membranes moist    Diagnostic Test Results:  none     ASSESSMENT/PLAN:       Piper was seen today for ear problem.    Diagnoses and all orders for this visit:    Infective otitis externa, left: discussed treating current otitis externa and ongoing if needed treatment with vosol to dry and treat the dermatitis.  -     neomycin-polymyxin-hydrocortisone (CORTISPORIN) 3.5-12020-0 otic solution; Place 3 drops Into the left ear 4 times daily for 7 days  -     acetic acid-hydrocortisone (VOSOL-HC) 1-2 % otic solution; Place 4 drops Into the left ear 2 times daily For 2 weeks then once a a day for few weeks then if needed.        Patient Instructions   Use the antibiotic pluc hydrocotirone drops for 7 days to treat the otitis externa, after that use the VolSol (acetic acid -drying agent + hydrocortisone - calms the dermatitis) 2 times a day for 2 weeks then once a  Day for 2 weekss then stop and you may need to restart this process from time to time with flares.    If hearing not improving then see the ENT specialist Dr. Maria.  Or see dermatology specialist if the skin irritation is not calming down.    Patient Education     External Ear Infection (Adult)    External otitis (also called  swimmer s ear ) is an infection in the ear canal. It is often caused by bacteria or fungus. It can occur a few days after water gets trapped in the ear canal (from swimming or bathing). It can also occur after cleaning too deeply in the ear canal with a cotton swab or other object. Sometimes, hair care products get into the ear canal and cause this problem.  Symptoms can include pain, fever, itching, redness, drainage, or swelling of the ear canal. Temporary hearing loss may also occur.  Home care    Do not try to clean the ear canal. This can push pus and bacteria deeper into the canal.    Use prescribed ear drops as directed. These help reduce swelling and  fight the infection. If an ear wick was placed in the ear canal, apply drops right onto the end of the wick. The wick will draw the medicine into the ear canal even if it is swollen closed.    A cotton ball may be loosely placed in the outer ear to absorb any drainage.    You may use acetaminophen or ibuprofen to control pain, unless another medicine was prescribed. Note: If you have chronic liver or kidney disease or ever had a stomach ulcer or GI bleeding, talk to your healthcare provider before taking any of these medicines.    Do not allow water to get into your ear when bathing. Also, don't swim until the infection has cleared.  Prevention    Keep your ears dry. This helps lower the risk of infection. Dry your ears with a towel or hair dryer after getting wet. Also, use ear plugs when swimming.    Do not stick any objects in the ear to remove wax.    If you feel water trapped in your ear, use ear drops right away. You can get these drops over the counter at most drugstores. They work by removing water from the ear canal.  Follow-up care  Follow up with your healthcare provider in 1 week, or as advised.  When to seek medical advice  Call your healthcare provider right away if any of these occur:    Ear pain becomes worse or doesn t improve after 3 days of treatment    Redness or swelling of the outer ear occurs or gets worse    Headache    Painful or stiff neck    Drowsiness or confusion    Fever of 100.4 F (38 C) or higher, or as directed by your healthcare provider    Seizure  Date Last Reviewed: 10/1/2017    9401-7395 [employer]. [address] [place] [address], [place] [address]. All rights reserved. This information is not intended as a substitute for professional medical care. Always follow your healthcare professional's instructions.               Johan Mantilla MD  [facility]

## 2019-08-21 ENCOUNTER — TELEPHONE (OUTPATIENT)
Dept: FAMILY MEDICINE | Facility: CLINIC | Age: 49
End: 2019-08-21

## 2019-08-21 DIAGNOSIS — I10 HTN, GOAL BELOW 130/80: ICD-10-CM

## 2019-08-21 NOTE — TELEPHONE ENCOUNTER
Left message for patient to return call to clinic.  Patient due for appt, has not seen PCP in over a year.  LOV was for ear infection.  Linh PADILLA RN

## 2019-08-22 RX ORDER — LISINOPRIL 5 MG/1
TABLET ORAL
Qty: 30 TABLET | Refills: 0 | Status: SHIPPED | OUTPATIENT
Start: 2019-08-22 | End: 2019-10-07 | Stop reason: DRUGHIGH

## 2019-08-22 NOTE — TELEPHONE ENCOUNTER
Patient has scheduled Office visit for 9/23/19.     Medication is being filled for 1 time refill only due to:  Patient needs to be seen because it has been more than one year since last visit.

## 2019-09-20 NOTE — PROGRESS NOTES
SUBJECTIVE:                                                    Piper Unger is 49 year old female   Chief Complaint   Patient presents with     Hypertension     Hypertension Follow-up      Do you check your blood pressure regularly outside of the clinic? No     Are you following a low salt diet? Yes    Are your blood pressures ever more than 140 on the top number (systolic) OR more   than 90 on the bottom number (diastolic), for example 140/90? Yes      How many servings of fruits and vegetables do you eat daily?  0-1    On average, how many sweetened beverages do you drink each day (soda, juice, sweet tea, etc)?   1    How many days per week do you miss taking your medication? 0          Problem list and histories reviewed & adjusted, as indicated.  Additional history: as documented    Patient Active Problem List   Diagnosis     Allergic rhinitis due to other allergen     Hypothyroidism     Excessive sweating     HYPERLIPIDEMIA LDL GOAL <130     Obesity (BMI 30-39.9)     Benign essential hypertension     Obesity (BMI 35.0-39.9) with comorbidity (H)     Past Surgical History:   Procedure Laterality Date     COLONOSCOPY N/A 3/15/2018    Procedure: COLONOSCOPY;  colonoscopy;  Surgeon: Shahzad Villafana MD;  Location: [facility]     NO HISTORY OF SURGERY         Social History     Tobacco Use     Smoking status: Never Smoker     Smokeless tobacco: Never Used   Substance Use Topics     Alcohol use: Yes     Comment: occasional     Family History   Problem Relation Age of Onset     Other - See Comments Father         fluid in lungs     Hypertension Father      Hypertension Sister      Hypertension Brother      Glaucoma Maternal Grandmother      Skin Cancer Maternal Grandfather      Crohn's Disease Son      Allergies Son         eczma     Lipids Sister          Current Outpatient Medications   Medication Sig Dispense Refill     acetic acid-hydrocortisone (VOSOL-HC) 1-2 % otic solution Place 4 drops Into the left ear 2  times daily For 2 weeks then once a a day for few weeks then if needed. 10 mL 11     lisinopril (PRINIVIL/ZESTRIL) 10 MG tablet Take 1 tablet (10 mg) by mouth daily 90 tablet 3     lisinopril (PRINIVIL/ZESTRIL) 5 MG tablet TAKE 1 TABLET BY MOUTH DAILY 30 tablet 0     Loratadine (CLARITIN PO) Take by mouth daily Reported on 4/18/2017       mupirocin (BACTROBAN) 2 % external ointment Apply topically 3 times daily 22 g 0     hydrocortisone (ANUSOL-HC) 25 MG Suppository Place 1 suppository (25 mg) rectally 2 times daily as needed for hemorrhoids (Patient not taking: Reported on 3/13/2019) 28 suppository 0     ketoconazole (NIZORAL) 2 % cream Apply topically 2 times daily (Patient not taking: Reported on 3/13/2019) 15 g 1     ketoconazole (NIZORAL) 2 % shampoo Apply to the affected area and wash off after 5 minutes.  Use 2-3 times a week.  30 day supply. (Patient not taking: Reported on 3/13/2019) 120 mL 11     ketoconazole (NIZORAL) 2 % shampoo Apply to the affected area and wash off after 5 minutes. Use 2-3 times a week. (Patient not taking: Reported on 3/13/2019) 120 mL 3     prednisoLONE sodium phosphate (INFLAMASE FORTE) 1 % ophthalmic solution Place into left ear twice daily (Patient not taking: Reported on 3/13/2019) 10 mL 3     Allergies   Allergen Reactions     Dust Mites      Pollen Extract      Recent Labs   Lab Test 07/22/18  1542 04/18/17  0925 09/18/14  1359 11/09/12  1456 09/29/12  0500  11/04/11  0845   LDL  --  171*  --   --   --   --  188*   HDL  --  51  --   --   --   --  46*   TRIG  --  295*  --   --   --   --  278*   ALT  --   --   --  45 51*  --   --    CR 0.70 0.77  --   --  0.71   < >  --    GFRESTIMATED 88 80  --   --  89   < >  --    GFRESTBLACK >90 >90  African American GFR Calc    --   --  >90   < >  --    POTASSIUM 3.9 3.7  --   --  3.9   < >  --    TSH  --  1.93 1.42 1.36  --   --  2.13    < > = values in this interval not displayed.      BP Readings from Last 3 Encounters:   09/23/19 (!)  "148/86   03/13/19 122/80   07/22/18 146/89    Wt Readings from Last 3 Encounters:   09/23/19 103.4 kg (228 lb)   05/15/18 104.6 kg (230 lb 9.6 oz)   03/15/18 103 kg (227 lb)         ROS:  Constitutional, HEENT, cardiovascular, pulmonary, gi and gu systems are negative, except as otherwise noted.    OBJECTIVE:                                                    BP (!) 148/86   Pulse 85   Temp 98.7  F (37.1  C) (Tympanic)   Resp 12   Ht 1.689 m (5' 6.5\")   Wt 103.4 kg (228 lb)   LMP 08/27/2019   SpO2 98%   BMI 36.25 kg/m    GENERAL APPEARANCE ADULT: Alert, no acute distress, obese  HENT: ear canal:erythematous, edematous, tender tragus , tender pinna, ear pinna abnormal scale  RESP: lungs clear to auscultation   CV: normal rate, regular rhythm, no murmur or gallop  Diagnostic Test Results:  none      ASSESSMENT/PLAN:                                                    1. Essential hypertension  Not at goal, increase dose and recheck in 4 weeks  - lisinopril (PRINIVIL/ZESTRIL) 10 MG tablet; Take 1 tablet (10 mg) by mouth daily  Dispense: 90 tablet; Refill: 3  - Basic metabolic panel    2. Morbid obesity (H)  Tired and gaining weight, history or low thyroid level  - TSH  - T4 FREE    3. Chronic otitis externa of left ear, unspecified type  bactroban helps but never resolved.  If not resolved after 2 weeks see ENT or allergist.  - mupirocin (BACTROBAN) 2 % external ointment; Apply topically 3 times daily  Dispense: 22 g; Refill: 0  - OTOLARYNGOLOGY REFERRAL  - ALLERGY/ASTHMA ADULT REFERRAL  - Ear Culture Aerobic Bacterial    Tayler Whitlock MD  [facility]    "

## 2019-09-23 ENCOUNTER — OFFICE VISIT (OUTPATIENT)
Dept: FAMILY MEDICINE | Facility: CLINIC | Age: 49
End: 2019-09-23
Payer: COMMERCIAL

## 2019-09-23 VITALS
RESPIRATION RATE: 12 BRPM | TEMPERATURE: 98.7 F | BODY MASS INDEX: 35.79 KG/M2 | WEIGHT: 228 LBS | SYSTOLIC BLOOD PRESSURE: 148 MMHG | HEIGHT: 67 IN | OXYGEN SATURATION: 98 % | DIASTOLIC BLOOD PRESSURE: 86 MMHG | HEART RATE: 85 BPM

## 2019-09-23 DIAGNOSIS — I10 ESSENTIAL HYPERTENSION: Primary | ICD-10-CM

## 2019-09-23 DIAGNOSIS — E66.01 MORBID OBESITY (H): ICD-10-CM

## 2019-09-23 DIAGNOSIS — H60.62 CHRONIC OTITIS EXTERNA OF LEFT EAR, UNSPECIFIED TYPE: ICD-10-CM

## 2019-09-23 LAB
ANION GAP SERPL CALCULATED.3IONS-SCNC: 6 MMOL/L (ref 3–14)
BUN SERPL-MCNC: 12 MG/DL (ref 7–30)
CALCIUM SERPL-MCNC: 8.3 MG/DL (ref 8.5–10.1)
CHLORIDE SERPL-SCNC: 107 MMOL/L (ref 94–109)
CO2 SERPL-SCNC: 27 MMOL/L (ref 20–32)
CREAT SERPL-MCNC: 0.73 MG/DL (ref 0.52–1.04)
GFR SERPL CREATININE-BSD FRML MDRD: >90 ML/MIN/{1.73_M2}
GLUCOSE SERPL-MCNC: 108 MG/DL (ref 70–99)
POTASSIUM SERPL-SCNC: 3.8 MMOL/L (ref 3.4–5.3)
SODIUM SERPL-SCNC: 140 MMOL/L (ref 133–144)
T4 FREE SERPL-MCNC: 0.69 NG/DL (ref 0.76–1.46)
TSH SERPL DL<=0.005 MIU/L-ACNC: 1.91 MU/L (ref 0.4–4)

## 2019-09-23 PROCEDURE — 87186 SC STD MICRODIL/AGAR DIL: CPT | Performed by: FAMILY MEDICINE

## 2019-09-23 PROCEDURE — 84439 ASSAY OF FREE THYROXINE: CPT | Performed by: FAMILY MEDICINE

## 2019-09-23 PROCEDURE — 87070 CULTURE OTHR SPECIMN AEROBIC: CPT | Performed by: FAMILY MEDICINE

## 2019-09-23 PROCEDURE — 80048 BASIC METABOLIC PNL TOTAL CA: CPT | Performed by: FAMILY MEDICINE

## 2019-09-23 PROCEDURE — 36415 COLL VENOUS BLD VENIPUNCTURE: CPT | Performed by: FAMILY MEDICINE

## 2019-09-23 PROCEDURE — 99214 OFFICE O/P EST MOD 30 MIN: CPT | Performed by: FAMILY MEDICINE

## 2019-09-23 PROCEDURE — 87077 CULTURE AEROBIC IDENTIFY: CPT | Performed by: FAMILY MEDICINE

## 2019-09-23 PROCEDURE — 84443 ASSAY THYROID STIM HORMONE: CPT | Performed by: FAMILY MEDICINE

## 2019-09-23 RX ORDER — LISINOPRIL 10 MG/1
10 TABLET ORAL DAILY
Qty: 90 TABLET | Refills: 3 | Status: SHIPPED | OUTPATIENT
Start: 2019-09-23 | End: 2020-09-15

## 2019-09-23 RX ORDER — MUPIROCIN 20 MG/G
OINTMENT TOPICAL 3 TIMES DAILY
Qty: 22 G | Refills: 0 | Status: SHIPPED | OUTPATIENT
Start: 2019-09-23 | End: 2019-10-07

## 2019-09-23 ASSESSMENT — MIFFLIN-ST. JEOR: SCORE: 1673.89

## 2019-09-23 NOTE — NURSING NOTE
"Initial BP (!) 148/86   Pulse 85   Temp 98.7  F (37.1  C) (Tympanic)   Resp 12   Ht 1.689 m (5' 6.5\")   Wt 103.4 kg (228 lb)   LMP 08/27/2019   SpO2 98%   BMI 36.25 kg/m   Estimated body mass index is 36.25 kg/m  as calculated from the following:    Height as of this encounter: 1.689 m (5' 6.5\").    Weight as of this encounter: 103.4 kg (228 lb). .    Parvin Hernandez, CHARLEY    "

## 2019-09-23 NOTE — PATIENT INSTRUCTIONS
The 10-year ASCVD risk score (Phong FINK Jr., et al., 2013) is: 4.1%    Values used to calculate the score:      Age: 49 years      Sex: Female      Is Non- : No      Diabetic: No      Tobacco smoker: No      Systolic Blood Pressure: 148 mmHg      Is BP treated: Yes      HDL Cholesterol: 51 mg/dL      Total Cholesterol: 281 mg/dL

## 2019-09-26 ENCOUNTER — TELEPHONE (OUTPATIENT)
Dept: FAMILY MEDICINE | Facility: CLINIC | Age: 49
End: 2019-09-26

## 2019-09-26 DIAGNOSIS — E03.9 HYPOTHYROIDISM, UNSPECIFIED TYPE: Primary | ICD-10-CM

## 2019-09-26 RX ORDER — LEVOTHYROXINE SODIUM 25 UG/1
25 TABLET ORAL DAILY
Qty: 90 TABLET | Refills: 3 | Status: SHIPPED | OUTPATIENT
Start: 2019-09-26 | End: 2022-06-15

## 2019-09-26 NOTE — TELEPHONE ENCOUNTER
Patient would like to start medication for thyroid as discussed but does not want to come back for appointment.     Routing to provider for consideration.      BELLA BurtN, RN

## 2019-09-26 NOTE — TELEPHONE ENCOUNTER
Reason for Call:  Other med question    Detailed comments: Patient wants the thyroid medication that she and Dr. Whitlock talked about.  She does not want to come in for another appointment.    Phone Number Patient can be reached at: Home number on file [phone] (home)    Best Time: any    Can we leave a detailed message on this number? YES    Call taken on 9/26/2019 at 1:14 PM by Selena Bhatt

## 2019-09-27 ENCOUNTER — TELEPHONE (OUTPATIENT)
Dept: FAMILY MEDICINE | Facility: CLINIC | Age: 49
End: 2019-09-27

## 2019-09-27 ENCOUNTER — TELEPHONE (OUTPATIENT)
Dept: OTOLARYNGOLOGY | Facility: CLINIC | Age: 49
End: 2019-09-27

## 2019-09-27 LAB
BACTERIA SPEC CULT: ABNORMAL
BACTERIA SPEC CULT: ABNORMAL
Lab: ABNORMAL
SPECIMEN SOURCE: ABNORMAL

## 2019-09-27 RX ORDER — CLINDAMYCIN PHOSPHATE 10 UG/ML
LOTION TOPICAL 2 TIMES DAILY
Qty: 60 ML | Refills: 0 | Status: SHIPPED | OUTPATIENT
Start: 2019-09-27 | End: 2019-10-30

## 2019-09-27 NOTE — TELEPHONE ENCOUNTER
Reason for Call:  Other prescription    Detailed comments: pt got a call from pharmacy about clindamycin being ready to  and pt didn't know about this RX. She ask if she should be taking that versus mupirocin or should she be doing both Rx please advise      Phone Number Patient can be reached at: Home number on file [phone] (home)    Best Time: any    Can we leave a detailed message on this number? YES    Call taken on 9/27/2019 at 11:06 AM by Kathleen Hernandez

## 2019-09-27 NOTE — RESULT ENCOUNTER NOTE
Discharge Note  Short Stay      SUMMARY     Admit Date: 3/20/2019    Attending Physician: Viet Wilson MD    Discharge Physician: Viet Wilson MD      Discharge Date: 3/20/2019 10:51 AM    Procedure(s) (LRB):  RADIOFREQUENCY ABLATION LEFT L2,3,4,5 (Left)    Final Diagnosis: Lumbar spondylosis [M47.816]    Disposition: Home or self care    Patient Instructions:   Current Discharge Medication List      CONTINUE these medications which have NOT CHANGED    Details   aspirin 81 MG Chew Take 81 mg by mouth once daily.      atenolol (TENORMIN) 50 MG tablet Take 1 tablet (50 mg total) by mouth 2 (two) times daily.  Qty: 180 tablet, Refills: 2    Associated Diagnoses: Essential hypertension      atorvastatin (LIPITOR) 20 MG tablet Take 1 tablet (20 mg total) by mouth once daily.  Qty: 90 tablet, Refills: 2    Associated Diagnoses: Hyperlipidemia LDL goal <100; Atherosclerosis of aorta      blood sugar diagnostic Strp 1 strip by Misc.(Non-Drug; Combo Route) route once daily.  Qty: 100 strip, Refills: 3    Comments: TRUE METRIX STRIPS  Associated Diagnoses: Type 2 diabetes mellitus without complication, without long-term current use of insulin      blood-glucose meter kit Use as instructed  Qty: 1 each, Refills: 0    Comments: TRUE METRIX METER  Associated Diagnoses: Type 2 diabetes mellitus with stage 2 chronic kidney disease, without long-term current use of insulin      calcium-vitamin D3 500 mg(1,250mg) -200 unit per tablet Take 1 tablet by mouth 2 (two) times daily with meals.       captopril (CAPOTEN) 50 MG tablet TAKE TWO TABLETS BY MOUTH IN THE MORNING AND ONE TABLET IN THE EVENING  Qty: 270 tablet, Refills: 2    Associated Diagnoses: Essential hypertension      clotrimazole (LOTRIMIN) 1 % cream Apply topically 2 (two) times daily.  Qty: 60 g, Refills: 5    Associated Diagnoses: Candidal intertrigo      cycloSPORINE (RESTASIS) 0.05 % ophthalmic emulsion 1 drop 2 (two) times daily.      diclofenac sodium  Please call Tea and make sure she has gotten this message on my chart:  Ear culture bacteria are methicillin resistant staph and strep, both sensitive to clindamycin.  Need to treat aggressively.  Make sure see ENT soon for recommendations.  I sent clindamycin lotion to [facility] in [place].  Use it twice a day for 10 days.  If you have question, please send me a [employer] message or call my care team [phone].  Thanks, GISELE FERNANDEZ MD.   ----    (VOLTAREN) 1 % Gel Apply 2 g topically 4 (four) times daily.  Qty: 1 Tube, Refills: 2      fluticasone (FLONASE) 50 mcg/actuation nasal spray 2 sprays (100 mcg total) by Each Nare route once daily.  Qty: 3 Bottle, Refills: 1    Associated Diagnoses: Perennial allergic rhinitis      hydroCHLOROthiazide (HYDRODIURIL) 25 MG tablet Take 1 tablet (25 mg total) by mouth once daily.  Qty: 90 tablet, Refills: 2    Associated Diagnoses: Essential hypertension      ibuprofen (ADVIL,MOTRIN) 800 MG tablet Take 1 tablet (800 mg total) by mouth 3 (three) times daily as needed.  Qty: 60 tablet, Refills: 3    Associated Diagnoses: Primary osteoarthritis involving multiple joints      ketoconazole (NIZORAL) 2 % cream Apply topically once daily.  Qty: 60 g, Refills: 3    Associated Diagnoses: Candidal intertrigo      ketoconazole (NIZORAL) 2 % shampoo       lancets Misc 1 lancet by Misc.(Non-Drug; Combo Route) route once daily.  Qty: 100 each, Refills: 3    Comments: TRUE PLUS LANCET 32 G  Associated Diagnoses: Type 2 diabetes mellitus with stage 2 chronic kidney disease, without long-term current use of insulin      metFORMIN (GLUCOPHAGE-XR) 500 MG 24 hr tablet TAKE TWO TABLETS BY MOUTH ONCE DAILY WITH BREAKFAST  Qty: 180 tablet, Refills: 2    Associated Diagnoses: Type 2 diabetes mellitus without complication, without long-term current use of insulin      minoxidil (LONITEN) 2.5 MG tablet       multivitamin (ONE DAILY MULTIVITAMIN) per tablet Take 1 tablet by mouth once daily.      omeprazole (PRILOSEC) 20 MG capsule Take 1 capsule (20 mg total) by mouth once daily.  Qty: 90 capsule, Refills: 2    Associated Diagnoses: Gastroesophageal reflux disease, esophagitis presence not specified      pneumococcal 23-janey ps vaccine (PNEUMOVAX 23) 25 mcg/0.5 mL Inject 0.5 mLs into the muscle once. for 1 dose  Qty: 0.5 mL, Refills: 0      tiZANidine (ZANAFLEX) 4 MG tablet Take 1 tablet (4 mg total) by mouth 2 (two) times daily as needed.  Qty:  60 tablet, Refills: 2    Comments: Please consider 90 day supplies to promote better adherence  Associated Diagnoses: Degenerative lumbar spinal stenosis      traMADol (ULTRAM) 50 mg tablet Take 1 tablet (50 mg total) by mouth every 6 (six) hours as needed.  Qty: 120 tablet, Refills: 2    Associated Diagnoses: Degenerative lumbar spinal stenosis                 Discharge Diagnosis: Lumbar spondylosis [M47.816]  Condition on Discharge: Stable with no complications to procedure   Diet on Discharge: Same as before.  Activity: as per instruction sheet.  Discharge to: Home with a responsible adult.  Follow up: 2-4 weeks

## 2019-09-27 NOTE — TELEPHONE ENCOUNTER
Reason for Call:  Other call back    Detailed comments: Pt calling has some questions - was told she should make appt with ENT.  Had ear culture - need treatment progressively.  Wondering if this is contagious?  RX was sent into her pharmacy today but states a different rx was sent in yesterday - wondering if she should be taking both?  Does she need to make an appt now or wait?    States she left a message with Dr. Whitlock but has not heard back - wondering if ENT nurse can call her back?    Phone Number Patient can be reached at: Home number on file [phone] (home)    Best Time: any    Can we leave a detailed message on this number? YES    Call taken on 9/27/2019 at 1:46 PM by Liberty Rudd

## 2019-09-27 NOTE — TELEPHONE ENCOUNTER
I spoke to Piper and told her that this would be a questions for the referring physician. She has questions about the medication given and if she is contagious. Natalya REEVES Rn

## 2019-10-01 ENCOUNTER — TELEPHONE (OUTPATIENT)
Dept: FAMILY MEDICINE | Facility: CLINIC | Age: 49
End: 2019-10-01

## 2019-10-02 NOTE — TELEPHONE ENCOUNTER
Prior Authorization Retail Medication Request    Medication/Dose: clindamycin  ICD code (if different than what is on RX):    Previously Tried and Failed:  Betamethasone; temovate; clobetasol propionate; nizoral;   Rationale:      Insurance Name:  [employer]  Insurance ID:  Not provided  CMM Key: [id number]      Pharmacy Information (if different than what is on RX)  Name:    Phone:

## 2019-10-04 NOTE — TELEPHONE ENCOUNTER
PA Initiation    Medication: clindamycin - initiated  Insurance Company: [employer] - Phone [phone] Fax [phone]  Pharmacy Filling the Rx: [id number] - [place], [place] - [address] [place] [address]  Filling Pharmacy Phone: [phone]  Case # [id number]  Start Date: 10/4/2019

## 2019-10-04 NOTE — PROGRESS NOTES
Chief Complaint   Patient presents with     Ear Problem     chronic otitis externa of left ear- has tried antibiotics, fungal medication , ear drops with no relief- saw a dermatologist also with no relief/audio     History of Present Illness  Piper Unger is a 49 year old female who presents to me today for ear evaluation.  I am seeing this patient in consultation for chronic otitis externa of the left ear at the request of the provider Dr. Whitlock.  The patient was being seen by her regular doctor for blood pressure issues.  The patient was also having problems with ear drainage, crusting, tenderness on the left-hand side.  Patient states that she has had these problems for many years.  When she was seen on 9/23/2019, a culture was performed.  The culture did grow methicillin-resistant staph resistant oxacillin and erythromycin.  It also grew out some Streptococcus agalactiae resistant to erythromycin. The patient feels like her hearing is somewhat down, more so on the left-hand side.  She has had intermittent otorrhea, more recently foul-smelling out of the left ear.  She denies any bloody otorrhea.  She does have some ear soreness without angel sharp pain.  She also has fairly persistent intermittent ear itching.  She seen ENTs in the past as well as dermatology.  She is had some problems with skin flaking and cracking on her ears in the past.  She has not been diagnosed with eczema in the past.  She had a similar problem on the back of her neck treated with topical steroids with good success.  She has not had previous ear surgery.    Past Medical History  Patient Active Problem List   Diagnosis     Allergic rhinitis due to other allergen     Hypothyroidism     Excessive sweating     HYPERLIPIDEMIA LDL GOAL <130     Obesity (BMI 30-39.9)     Benign essential hypertension     Obesity (BMI 35.0-39.9) with comorbidity (H)     Current Medications     Current Outpatient Medications:       ciprofloxacin-dexamethasone (CIPRODEX) 0.3-0.1 % otic suspension, Place 5 drops in draining ear twice daily for 10 days.  Call if drainage persistent past 10 days., Disp: 10 mL, Rfl: 3     clindamycin (CLEOCIN T) 1 % external lotion, Apply topically 2 times daily for 10 days, Disp: 60 mL, Rfl: 0     levothyroxine (SYNTHROID/LEVOTHROID) 25 MCG tablet, Take 1 tablet (25 mcg) by mouth daily, Disp: 90 tablet, Rfl: 3     Loratadine (CLARITIN PO), Take by mouth daily Reported on 4/18/2017, Disp: , Rfl:      triamcinolone (KENALOG) 0.1 % external cream, Apply 1 inch to affected areas twice daily for 10 days., Disp: 30 g, Rfl: 3     hydrocortisone (ANUSOL-HC) 25 MG Suppository, Place 1 suppository (25 mg) rectally 2 times daily as needed for hemorrhoids (Patient not taking: Reported on 3/13/2019), Disp: 28 suppository, Rfl: 0     ketoconazole (NIZORAL) 2 % shampoo, Apply to the affected area and wash off after 5 minutes. Use 2-3 times a week. (Patient not taking: Reported on 3/13/2019), Disp: 120 mL, Rfl: 3     lisinopril (PRINIVIL/ZESTRIL) 10 MG tablet, Take 1 tablet (10 mg) by mouth daily, Disp: 90 tablet, Rfl: 3    Allergies  Allergies   Allergen Reactions     Dust Mites      Pollen Extract        Social History   Social History     Socioeconomic History     Marital status:      Spouse name: Not on file     Number of children: Not on file     Years of education: Not on file     Highest education level: Not on file   Occupational History     Not on file   Social Needs     Financial resource strain: Not on file     Food insecurity:     Worry: Not on file     Inability: Not on file     Transportation needs:     Medical: Not on file     Non-medical: Not on file   Tobacco Use     Smoking status: Never Smoker     Smokeless tobacco: Never Used   Substance and Sexual Activity     Alcohol use: Yes     Comment: occasional     Drug use: No     Sexual activity: Not Currently     Partners: Male     Birth control/protection:  Pill   Lifestyle     Physical activity:     Days per week: Not on file     Minutes per session: Not on file     Stress: Not on file   Relationships     Social connections:     Talks on phone: Not on file     Gets together: Not on file     Attends Voodoo service: Not on file     Active member of club or organization: Not on file     Attends meetings of clubs or organizations: Not on file     Relationship status: Not on file     Intimate partner violence:     Fear of current or ex partner: Not on file     Emotionally abused: Not on file     Physically abused: Not on file     Forced sexual activity: Not on file   Other Topics Concern      Service No     Blood Transfusions No     Caffeine Concern Yes     Comment: 2 cans a week     Occupational Exposure No     Hobby Hazards No     Sleep Concern No     Stress Concern Yes     Weight Concern Yes     Special Diet Yes     Comment: occasionally     Back Care No     Exercise No     Bike Helmet No     Seat Belt Yes     Self-Exams Yes     Parent/sibling w/ CABG, MI or angioplasty before 65F 55M? No   Social History Narrative     Not on file       Family History  Family History   Problem Relation Age of Onset     Other - See Comments Father         fluid in lungs     Hypertension Father      Skin Cancer Father      Hypertension Sister      Hyperlipidemia Sister      Hypertension Brother      Glaucoma Maternal Grandmother      Skin Cancer Maternal Grandfather      Crohn's Disease Son      Allergies Son         eczema       Review of Systems  As per HPI and PMHx, otherwise 10+ comprehensive system review is negative.    Physical Exam  /82 (BP Location: Right arm, Patient Position: Chair, Cuff Size: Adult Large)   Pulse 93   Temp 98.1  F (36.7  C) (Oral)   Wt 103.4 kg (228 lb)   BMI 36.25 kg/m    GENERAL: Patient is a pleasant, cooperative 49 year old female in no acute distress.  HEAD: Normocephalic, atraumatic.  Hair and scalp are normal.  EYES: Pupils are  equal, round, reactive to light and accommodation.  Extraocular movements are intact.  The sclera nonicteric without injection.  The extraocular structures are normal.  EARS: See below.    NOSE: Nares are patent.  Nasal mucosa is pink and moist.  Negative anterior rhinoscopy.  NEUROLOGIC: Cranial nerves II through XII are grossly intact.  Voice is strong.  Patient is House-Brackman I/VI bilaterally.  CARDIOVASCULAR: Extremities are warm and well-perfused.  No significant peripheral edema.  RESPIRATORY: Patient has nonlabored breathing without cough, wheeze, stridor.  PSYCHIATRIC: Patient is alert and oriented.  Mood and affect appear normal.  SKIN: Warm and dry.  No scalp, face, or neck lesions noted.    Physical Exam and Procedure    EARS: Normal shape and symmetry.  No tenderness when palpating the mastoid or tragal areas bilaterally.  Patient did have some crusting and flakiness of the external pinna.  There is a small amount in the conchal bowl on the right and a moderate amount involving the pinna, antitragus, scaphoid fossa.  The ears were then examined under the otic binocular microscope.  Otoscopic exam on the right reveals a clear canal, intact tympanic membrane without evidence of effusion, good landmarks.  No retraction, granulation, drainage, or evidence of cholesteatoma.      Attention was turned to the left ear.  Otoscopic exam on the left reveals some moisture in the external auditory canal and some moist ceruminous debris down the level of tympanic membrane.  The cerumen was removed with a #5 suction.  The left tympanic membrane was round, intact without evidence of effusion.  No retraction, granulation, drainage, or evidence of cholesteatoma.      Assessment and Plan     ICD-10-CM    1. Chronic diffuse otitis externa of left ear H60.312 AUDIOLOGY ADULT REFERRAL     triamcinolone (KENALOG) 0.1 % external cream     ciprofloxacin-dexamethasone (CIPRODEX) 0.3-0.1 % otic suspension     Remove Cerumen    2. Eczema of external ear, bilateral H60.543 AUDIOLOGY ADULT REFERRAL     triamcinolone (KENALOG) 0.1 % external cream     ciprofloxacin-dexamethasone (CIPRODEX) 0.3-0.1 % otic suspension     Remove Cerumen     It was my pleasure seeing Piper Unger today in clinic.  The patient has signs of external auditory canal dermatitis and possible eczema.  This is worse in the left-hand side.  She did have some active drainage recently that was culture to be consistent with community-acquired methicillin-resistant staph that was pansensitive.  She also had some strep bacteria that grew out of her culture.  I think that we will switch her to ciprofloxacin dexamethasone topically for the left ear and then some triamcinolone cream for the external part of the ear.  I like to see her back in roughly 2 weeks to recheck her ear.  I discussed keeping the ear dry, and to not place anything in the ear except for the ear drops.  She will return in 2 to 3 weeks for repeat ear exam and hearing test.    Piper to follow up with Primary Care provider regarding elevated blood pressure.    Darron Herrera MD  Department of Otolarygology-Head and Neck Surgery  [facility]

## 2019-10-07 ENCOUNTER — OFFICE VISIT (OUTPATIENT)
Dept: OTOLARYNGOLOGY | Facility: CLINIC | Age: 49
End: 2019-10-07
Payer: COMMERCIAL

## 2019-10-07 ENCOUNTER — OFFICE VISIT (OUTPATIENT)
Dept: AUDIOLOGY | Facility: CLINIC | Age: 49
End: 2019-10-07
Payer: COMMERCIAL

## 2019-10-07 VITALS
WEIGHT: 228 LBS | HEART RATE: 93 BPM | BODY MASS INDEX: 36.25 KG/M2 | DIASTOLIC BLOOD PRESSURE: 82 MMHG | SYSTOLIC BLOOD PRESSURE: 123 MMHG | TEMPERATURE: 98.1 F

## 2019-10-07 DIAGNOSIS — H60.312 CHRONIC DIFFUSE OTITIS EXTERNA OF LEFT EAR: Primary | ICD-10-CM

## 2019-10-07 DIAGNOSIS — H60.543 ECZEMA OF EXTERNAL EAR, BILATERAL: ICD-10-CM

## 2019-10-07 DIAGNOSIS — H60.90 OTITIS EXTERNA: Primary | ICD-10-CM

## 2019-10-07 PROCEDURE — 92504 EAR MICROSCOPY EXAMINATION: CPT | Performed by: OTOLARYNGOLOGY

## 2019-10-07 PROCEDURE — 99203 OFFICE O/P NEW LOW 30 MIN: CPT | Mod: 25 | Performed by: OTOLARYNGOLOGY

## 2019-10-07 PROCEDURE — 99207 ZZC NO CHARGE LOS: CPT | Performed by: AUDIOLOGIST

## 2019-10-07 RX ORDER — CIPROFLOXACIN AND DEXAMETHASONE 3; 1 MG/ML; MG/ML
SUSPENSION/ DROPS AURICULAR (OTIC)
Qty: 10 ML | Refills: 3 | Status: SHIPPED | OUTPATIENT
Start: 2019-10-07 | End: 2020-09-28

## 2019-10-07 RX ORDER — TRIAMCINOLONE ACETONIDE 1 MG/G
CREAM TOPICAL
Qty: 30 G | Refills: 3 | Status: SHIPPED | OUTPATIENT
Start: 2019-10-07 | End: 2020-09-28

## 2019-10-07 ASSESSMENT — PAIN SCALES - GENERAL: PAINLEVEL: NO PAIN (0)

## 2019-10-07 NOTE — LETTER
10/7/2019         RE: Piper Unger  [address] [facility] [id number]        Dear Colleague,    Thank you for referring your patient, Piper Unger, to the [facility]. Please see a copy of my visit note below.    Chief Complaint   Patient presents with     Ear Problem     chronic otitis externa of left ear- has tried antibiotics, fungal medication , ear drops with no relief- saw a dermatologist also with no relief/audio     History of Present Illness  Piper Unger is a 49 year old female who presents to me today for ear evaluation.  I am seeing this patient in consultation for chronic otitis externa of the left ear at the request of the provider Dr. Whitlock.  The patient was being seen by her regular doctor for blood pressure issues.  The patient was also having problems with ear drainage, crusting, tenderness on the left-hand side.  Patient states that she has had these problems for many years.  When she was seen on 9/23/2019, a culture was performed.  The culture did grow methicillin-resistant staph resistant oxacillin and erythromycin.  It also grew out some Streptococcus agalactiae resistant to erythromycin. The patient feels like her hearing is somewhat down, more so on the left-hand side.  She has had intermittent otorrhea, more recently foul-smelling out of the left ear.  She denies any bloody otorrhea.  She does have some ear soreness without angel sharp pain.  She also has fairly persistent intermittent ear itching.  She seen ENTs in the past as well as dermatology.  She is had some problems with skin flaking and cracking on her ears in the past.  She has not been diagnosed with eczema in the past.  She had a similar problem on the back of her neck treated with topical steroids with good success.  She has not had previous ear surgery.    Past Medical History  Patient Active Problem List   Diagnosis     Allergic rhinitis due to other allergen     Hypothyroidism      Excessive sweating     HYPERLIPIDEMIA LDL GOAL <130     Obesity (BMI 30-39.9)     Benign essential hypertension     Obesity (BMI 35.0-39.9) with comorbidity (H)     Current Medications     Current Outpatient Medications:      ciprofloxacin-dexamethasone (CIPRODEX) 0.3-0.1 % otic suspension, Place 5 drops in draining ear twice daily for 10 days.  Call if drainage persistent past 10 days., Disp: 10 mL, Rfl: 3     clindamycin (CLEOCIN T) 1 % external lotion, Apply topically 2 times daily for 10 days, Disp: 60 mL, Rfl: 0     levothyroxine (SYNTHROID/LEVOTHROID) 25 MCG tablet, Take 1 tablet (25 mcg) by mouth daily, Disp: 90 tablet, Rfl: 3     Loratadine (CLARITIN PO), Take by mouth daily Reported on 4/18/2017, Disp: , Rfl:      triamcinolone (KENALOG) 0.1 % external cream, Apply 1 inch to affected areas twice daily for 10 days., Disp: 30 g, Rfl: 3     hydrocortisone (ANUSOL-HC) 25 MG Suppository, Place 1 suppository (25 mg) rectally 2 times daily as needed for hemorrhoids (Patient not taking: Reported on 3/13/2019), Disp: 28 suppository, Rfl: 0     ketoconazole (NIZORAL) 2 % shampoo, Apply to the affected area and wash off after 5 minutes. Use 2-3 times a week. (Patient not taking: Reported on 3/13/2019), Disp: 120 mL, Rfl: 3     lisinopril (PRINIVIL/ZESTRIL) 10 MG tablet, Take 1 tablet (10 mg) by mouth daily, Disp: 90 tablet, Rfl: 3    Allergies  Allergies   Allergen Reactions     Dust Mites      Pollen Extract        Social History   Social History     Socioeconomic History     Marital status:      Spouse name: Not on file     Number of children: Not on file     Years of education: Not on file     Highest education level: Not on file   Occupational History     Not on file   Social Needs     Financial resource strain: Not on file     Food insecurity:     Worry: Not on file     Inability: Not on file     Transportation needs:     Medical: Not on file     Non-medical: Not on file   Tobacco Use     Smoking  status: Never Smoker     Smokeless tobacco: Never Used   Substance and Sexual Activity     Alcohol use: Yes     Comment: occasional     Drug use: No     Sexual activity: Not Currently     Partners: Male     Birth control/protection: Pill   Lifestyle     Physical activity:     Days per week: Not on file     Minutes per session: Not on file     Stress: Not on file   Relationships     Social connections:     Talks on phone: Not on file     Gets together: Not on file     Attends Zoroastrianism service: Not on file     Active member of club or organization: Not on file     Attends meetings of clubs or organizations: Not on file     Relationship status: Not on file     Intimate partner violence:     Fear of current or ex partner: Not on file     Emotionally abused: Not on file     Physically abused: Not on file     Forced sexual activity: Not on file   Other Topics Concern      Service No     Blood Transfusions No     Caffeine Concern Yes     Comment: 2 cans a week     Occupational Exposure No     Hobby Hazards No     Sleep Concern No     Stress Concern Yes     Weight Concern Yes     Special Diet Yes     Comment: occasionally     Back Care No     Exercise No     Bike Helmet No     Seat Belt Yes     Self-Exams Yes     Parent/sibling w/ CABG, MI or angioplasty before 65F 55M? No   Social History Narrative     Not on file       Family History  Family History   Problem Relation Age of Onset     Other - See Comments Father         fluid in lungs     Hypertension Father      Skin Cancer Father      Hypertension Sister      Hyperlipidemia Sister      Hypertension Brother      Glaucoma Maternal Grandmother      Skin Cancer Maternal Grandfather      Crohn's Disease Son      Allergies Son         eczema       Review of Systems  As per HPI and PMHx, otherwise 10+ comprehensive system review is negative.    Physical Exam  /82 (BP Location: Right arm, Patient Position: Chair, Cuff Size: Adult Large)   Pulse 93   Temp 98.1   F (36.7  C) (Oral)   Wt 103.4 kg (228 lb)   BMI 36.25 kg/m     GENERAL: Patient is a pleasant, cooperative 49 year old female in no acute distress.  HEAD: Normocephalic, atraumatic.  Hair and scalp are normal.  EYES: Pupils are equal, round, reactive to light and accommodation.  Extraocular movements are intact.  The sclera nonicteric without injection.  The extraocular structures are normal.  EARS: See below.    NOSE: Nares are patent.  Nasal mucosa is pink and moist.  Negative anterior rhinoscopy.  NEUROLOGIC: Cranial nerves II through XII are grossly intact.  Voice is strong.  Patient is House-Brackman I/VI bilaterally.  CARDIOVASCULAR: Extremities are warm and well-perfused.  No significant peripheral edema.  RESPIRATORY: Patient has nonlabored breathing without cough, wheeze, stridor.  PSYCHIATRIC: Patient is alert and oriented.  Mood and affect appear normal.  SKIN: Warm and dry.  No scalp, face, or neck lesions noted.    Physical Exam and Procedure    EARS: Normal shape and symmetry.  No tenderness when palpating the mastoid or tragal areas bilaterally.  Patient did have some crusting and flakiness of the external pinna.  There is a small amount in the conchal bowl on the right and a moderate amount involving the pinna, antitragus, scaphoid fossa.  The ears were then examined under the otic binocular microscope.  Otoscopic exam on the right reveals a clear canal, intact tympanic membrane without evidence of effusion, good landmarks.  No retraction, granulation, drainage, or evidence of cholesteatoma.      Attention was turned to the left ear.  Otoscopic exam on the left reveals some moisture in the external auditory canal and some moist ceruminous debris down the level of tympanic membrane.  The cerumen was removed with a #5 suction.  The left tympanic membrane was round, intact without evidence of effusion.  No retraction, granulation, drainage, or evidence of cholesteatoma.      Assessment and Plan      ICD-10-CM    1. Chronic diffuse otitis externa of left ear H60.312 AUDIOLOGY ADULT REFERRAL     triamcinolone (KENALOG) 0.1 % external cream     ciprofloxacin-dexamethasone (CIPRODEX) 0.3-0.1 % otic suspension     Remove Cerumen   2. Eczema of external ear, bilateral H60.543 AUDIOLOGY ADULT REFERRAL     triamcinolone (KENALOG) 0.1 % external cream     ciprofloxacin-dexamethasone (CIPRODEX) 0.3-0.1 % otic suspension     Remove Cerumen     It was my pleasure seeing Piper Ungre today in clinic.  The patient has signs of external auditory canal dermatitis and possible eczema.  This is worse in the left-hand side.  She did have some active drainage recently that was culture to be consistent with community-acquired methicillin-resistant staph that was pansensitive.  She also had some strep bacteria that grew out of her culture.  I think that we will switch her to ciprofloxacin dexamethasone topically for the left ear and then some triamcinolone cream for the external part of the ear.  I like to see her back in roughly 2 weeks to recheck her ear.  I discussed keeping the ear dry, and to not place anything in the ear except for the ear drops.  She will return in 2 to 3 weeks for repeat ear exam and hearing test.    Piper to follow up with Primary Care provider regarding elevated blood pressure.    Darron Herrera MD  Department of Otolarygology-Head and Neck Surgery  [facility]    Again, thank you for allowing me to participate in the care of your patient.        Sincerely,        Darron Herrera MD

## 2019-10-07 NOTE — PROGRESS NOTES
AUDIOLOGY REPORT    SUBJECTIVE:  Piper Unger is a 49 year old female who was seen in the Audiology Clinic at [facility] for an audiologic evaluation, referred by Dr. Herrera.  No previous audiograms are available at today's appointment.  The patient reports a history of chronic externa otitis in the left ear only. She reports be treated with numerous ear drops and lotions with limited success. The patient denies bilateral tinnitus and bilateral drainage.     OBJECTIVE:  Abuse Screening:  Do you feel unsafe at home or work/school? No   Do you feel threatened by someone? No   Does anyone try to keep you from having contact with others, or doing things outside of your home? No   Physical signs of abuse present? No      Otoscopic exam indicates left ear occluded with debris,right ear clear     After medical evaluation by Dr. Herrera the audiogram today was deferred.     ASSESSMENT:   Otitis externa of the left ear.     Today s results were discussed with the patient in detail.     PLAN: It is recommended that the patient be seen by Dr. Herrera for medical evaluation of their ears and hearing evaluation.  Please call this clinic with questions regarding these results or recommendations.        Kimberly Soriano M.A. TAY-AAA  Clinical audiologist [place] # [id number]  10/7/2019

## 2019-10-07 NOTE — NURSING NOTE
"Initial BP (!) 161/98 (BP Location: Left arm, Patient Position: Chair, Cuff Size: Adult Regular)   Pulse 93   Temp 98.1  F (36.7  C) (Oral)   Wt 103.4 kg (228 lb)   BMI 36.25 kg/m   Estimated body mass index is 36.25 kg/m  as calculated from the following:    Height as of 9/23/19: 1.689 m (5' 6.5\").    Weight as of this encounter: 103.4 kg (228 lb). .    Pamela Manriquez LPN    "

## 2019-10-09 NOTE — TELEPHONE ENCOUNTER
Prior Authorization Approval    Authorization Effective Date: 9/27/2019  Authorization Expiration Date: 9/27/2020  Medication: clindamycin - APPROVED  Approved Dose/Quantity: 60 ML FOR 30 DAYS  Reference #: [id number]   Insurance Company: [employer] - Phone [phone] Fax [phone]  Which Pharmacy is filling the prescription (Not needed for infusion/clinic administered): [id number] - [address]  Pharmacy Notified: Yes  Patient Notified: Yes, pharmacy calling PT once ready

## 2019-10-12 ENCOUNTER — TELEPHONE (OUTPATIENT)
Dept: FAMILY MEDICINE | Facility: CLINIC | Age: 49
End: 2019-10-12

## 2019-10-28 NOTE — PROGRESS NOTES
Chief Complaint   Patient presents with     Ent Problem     Recheck Left ear/audio done     History of Present Illness  Piper Unger is a 49 year old female who presents to me today for ear follow up.  I evaluated the patient on 10/7/2019.  She was having an episode of left acute otitis externa.  Patient has intermittently had problems for many years.  An ear culture was performed on 9/23/2019, which did grow methicillin-resistant staph resistant oxacillin and erythromycin.  It also grew out some Streptococcus agalactiae resistant to erythromycin.   I placed the patient on an external triamcinolone and Ciprodex eardrops.  She returns today for repeat evaluation and debridement.      Since last seeing the patient, the patient had significant improvement in her symptoms.  She did use the external cream in the sort X drops and has had significant improvement in her crusting and scaling.  She currently denies any otalgia, otorrhea, bloody otorrhea, dizziness, tinnitus, vertigo.  She has not had previous ear surgery.    Past Medical History  Patient Active Problem List   Diagnosis     Allergic rhinitis due to other allergen     Hypothyroidism     Excessive sweating     HYPERLIPIDEMIA LDL GOAL <130     Obesity (BMI 30-39.9)     Benign essential hypertension     Obesity (BMI 35.0-39.9) with comorbidity (H)     Current Medications    Current Outpatient Medications:      hydrocortisone (ANUSOL-HC) 25 MG Suppository, Place 1 suppository (25 mg) rectally 2 times daily as needed for hemorrhoids, Disp: 28 suppository, Rfl: 0     ketoconazole (NIZORAL) 2 % shampoo, Apply to the affected area and wash off after 5 minutes. Use 2-3 times a week., Disp: 120 mL, Rfl: 3     levothyroxine (SYNTHROID/LEVOTHROID) 25 MCG tablet, Take 1 tablet (25 mcg) by mouth daily, Disp: 90 tablet, Rfl: 3     lisinopril (PRINIVIL/ZESTRIL) 10 MG tablet, Take 1 tablet (10 mg) by mouth daily, Disp: 90 tablet, Rfl: 3     Loratadine (CLARITIN PO), Take  by mouth daily Reported on 4/18/2017, Disp: , Rfl:      triamcinolone (KENALOG) 0.1 % external cream, Apply 1 inch to affected areas twice daily for 10 days., Disp: 30 g, Rfl: 3     ciprofloxacin-dexamethasone (CIPRODEX) 0.3-0.1 % otic suspension, Place 5 drops in draining ear twice daily for 10 days.  Call if drainage persistent past 10 days. (Patient not taking: Reported on 10/30/2019), Disp: 10 mL, Rfl: 3    Allergies  Allergies   Allergen Reactions     Dust Mites      Pollen Extract        Social History  Social History     Socioeconomic History     Marital status:      Spouse name: Not on file     Number of children: Not on file     Years of education: Not on file     Highest education level: Not on file   Occupational History     Not on file   Social Needs     Financial resource strain: Not on file     Food insecurity:     Worry: Not on file     Inability: Not on file     Transportation needs:     Medical: Not on file     Non-medical: Not on file   Tobacco Use     Smoking status: Never Smoker     Smokeless tobacco: Never Used   Substance and Sexual Activity     Alcohol use: Yes     Comment: occasional     Drug use: No     Sexual activity: Not Currently     Partners: Male     Birth control/protection: Pill   Lifestyle     Physical activity:     Days per week: Not on file     Minutes per session: Not on file     Stress: Not on file   Relationships     Social connections:     Talks on phone: Not on file     Gets together: Not on file     Attends Druze service: Not on file     Active member of club or organization: Not on file     Attends meetings of clubs or organizations: Not on file     Relationship status: Not on file     Intimate partner violence:     Fear of current or ex partner: Not on file     Emotionally abused: Not on file     Physically abused: Not on file     Forced sexual activity: Not on file   Other Topics Concern      Service No     Blood Transfusions No     Caffeine Concern Yes  "    Comment: 2 cans a week     Occupational Exposure No     Hobby Hazards No     Sleep Concern No     Stress Concern Yes     Weight Concern Yes     Special Diet Yes     Comment: occasionally     Back Care No     Exercise No     Bike Helmet No     Seat Belt Yes     Self-Exams Yes     Parent/sibling w/ CABG, MI or angioplasty before 65F 55M? No   Social History Narrative     Not on file       Family History  Family History   Problem Relation Age of Onset     Other - See Comments Father         fluid in lungs     Hypertension Father      Skin Cancer Father      Hypertension Sister      Hyperlipidemia Sister      Hypertension Brother      Glaucoma Maternal Grandmother      Skin Cancer Maternal Grandfather      Crohn's Disease Son      Allergies Son         eczema       Review of Systems  As per HPI and PMHx, otherwise 10 system review including the head and neck, constitutional, eyes, respiratory, GI, skin, neurologic, lymphatic, endocrine, and allergy systems is negative.    Physical Exam  /81 (BP Location: Left arm, Patient Position: Sitting, Cuff Size: Adult Large)   Pulse 78   Temp 97.8  F (36.6  C) (Oral)   Ht 1.689 m (5' 6.5\")   BMI 36.25 kg/m    GENERAL: Patient is a pleasant, cooperative 49 year old female in no acute distress.  HEAD: Normocephalic, atraumatic.  Hair and scalp are normal.  EYES: Pupils are equal, round, reactive to light and accommodation.  Extraocular movements are intact.  The sclera nonicteric without injection.  The extraocular structures are normal.  EARS: Normal shape and symmetry.  No tenderness when palpating the mastoid or tragal areas bilaterally.  Patient has had significant improvement in the flakiness/crusting of the external pinna on the right.  Otoscopic exam on the right reveals a clear canal, intact tympanic membrane without evidence of effusion, good landmarks.  No retraction, granulation, drainage, or evidence of cholesteatoma.  Attention was turned to the left ear.  "   No significant crusting or flaking of the external auditory canal or pinna.  Otoscopic exam on the left reveals a clear canal without any moisture.  There is normal ceruminous debris.  The left tympanic membrane was round, intact without evidence of effusion.  No retraction, granulation, drainage, or evidence of cholesteatoma.   NOSE: Nares are patent.  Nasal mucosa is pink and moist.  Negative anterior rhinoscopy.  NEUROLOGIC: Cranial nerves II through XII are grossly intact.  Voice is strong.  Patient is House-Brackman I/VI bilaterally.  CARDIOVASCULAR: Extremities are warm and well-perfused.  No significant peripheral edema.  RESPIRATORY: Patient has nonlabored breathing without cough, wheeze, stridor.  PSYCHIATRIC: Patient is alert and oriented.  Mood and affect appear normal.  SKIN: Warm and dry.  No scalp, face, or neck lesions noted.    Audiogram  The patient underwent an audiogram performed today.  My review of the audiogram shows normal hearing across all tested frequencies.  Pure-tone average is 5 dB on the right and 7 dB on the left.  Speech reception threshhold is 5 dB on the right and 5 dB on the left.  The patient had 100% word recognition on the right and 100% word recognition on the left.  The patient had a type A tympanogram on the right and a type A tympanogram on the left.     Assessment and Plan     ICD-10-CM    1. Eczema of external ear, bilateral H60.543 AUDIOLOGY ADULT REFERRAL   2. History of acute otitis externa Z86.69 AUDIOLOGY ADULT REFERRAL      It was my pleasure seeing Piper Unger today in clinic.  Her ears look much improved on examination today.  I would recommend that she return in 4 to 6 months for repeat ear check.  She can always return sooner if she is having problems or concerns.  We discussed using the drops/external steroid cream intermittently if she is having symptoms.  I be happy to refill her medications in the future if she is having symptoms.    Darron Herrera,  MD  Department of Otolarygology-Head and Neck Surgery  [facility]

## 2019-10-30 ENCOUNTER — OFFICE VISIT (OUTPATIENT)
Dept: OTOLARYNGOLOGY | Facility: CLINIC | Age: 49
End: 2019-10-30
Payer: COMMERCIAL

## 2019-10-30 ENCOUNTER — OFFICE VISIT (OUTPATIENT)
Dept: AUDIOLOGY | Facility: CLINIC | Age: 49
End: 2019-10-30
Payer: COMMERCIAL

## 2019-10-30 VITALS
SYSTOLIC BLOOD PRESSURE: 130 MMHG | TEMPERATURE: 97.8 F | BODY MASS INDEX: 36.25 KG/M2 | DIASTOLIC BLOOD PRESSURE: 81 MMHG | HEART RATE: 78 BPM | HEIGHT: 67 IN

## 2019-10-30 DIAGNOSIS — Z86.69 HISTORY OF ACUTE OTITIS EXTERNA: ICD-10-CM

## 2019-10-30 DIAGNOSIS — H60.543 ECZEMA OF EXTERNAL EAR, BILATERAL: Primary | ICD-10-CM

## 2019-10-30 DIAGNOSIS — H60.90 OTITIS EXTERNA: Primary | ICD-10-CM

## 2019-10-30 PROCEDURE — 99207 ZZC NO CHARGE LOS: CPT | Performed by: AUDIOLOGIST

## 2019-10-30 PROCEDURE — 99213 OFFICE O/P EST LOW 20 MIN: CPT | Performed by: OTOLARYNGOLOGY

## 2019-10-30 PROCEDURE — 92557 COMPREHENSIVE HEARING TEST: CPT | Performed by: AUDIOLOGIST

## 2019-10-30 PROCEDURE — 92550 TYMPANOMETRY & REFLEX THRESH: CPT | Performed by: AUDIOLOGIST

## 2019-10-30 NOTE — NURSING NOTE
"Initial /81 (BP Location: Left arm, Patient Position: Sitting, Cuff Size: Adult Large)   Pulse 78   Temp 97.8  F (36.6  C) (Oral)   Ht 1.689 m (5' 6.5\")   BMI 36.25 kg/m   Estimated body mass index is 36.25 kg/m  as calculated from the following:    Height as of this encounter: 1.689 m (5' 6.5\").    Weight as of 10/7/19: 103.4 kg (228 lb). .    Courtney Randolph CMA    "

## 2019-10-30 NOTE — LETTER
10/30/2019         RE: Piper Unger  [address] [facility] [address]        Dear Colleague,    Thank you for referring your patient, Piper Unger, to the [facility]. Please see a copy of my visit note below.    Chief Complaint   Patient presents with     Ent Problem     Recheck Left ear/audio done     History of Present Illness  Piper Unger is a 49 year old female who presents to me today for ear follow up.  I  evaluated the patient on 10/7/2019.  She was having an episode of left acute otitis externa.  Patient has intermittently had problems for many years.  An ear culture was performed on 9/23/2019, which did grow methicillin-resistant staph resistant oxacillin and erythromycin.  It also grew out some Streptococcus agalactiae resistant to erythromycin.    I placed the patient on an external triamcinolone and Ciprodex eardrops.  She returns today for repeat evaluation and debridement.      Since last seeing the patient, the patient had significant improvement in her symptoms.  She did use the external cream in the sort X drops and has had significant improvement in her crusting and scaling.  She currently denies any otalgia, otorrhea, bloody otorrhea, dizziness, tinnitus, vertigo.  She has not had previous ear surgery.    Past Medical History  Patient Active Problem List   Diagnosis     Allergic rhinitis due to other allergen     Hypothyroidism     Excessive sweating     HYPERLIPIDEMIA LDL GOAL <130     Obesity (BMI 30-39.9)     Benign essential hypertension     Obesity (BMI 35.0-39.9) with comorbidity (H)     Current Medications    Current Outpatient Medications:      hydrocortisone (ANUSOL-HC) 25 MG Suppository, Place 1 suppository (25 mg) rectally 2 times daily as needed for hemorrhoids, Disp: 28 suppository, Rfl: 0     ketoconazole (NIZORAL) 2 % shampoo, Apply to the affected area and wash off after 5 minutes. Use 2-3 times a week., Disp: 120 mL, Rfl: 3      levothyroxine (SYNTHROID/LEVOTHROID) 25 MCG tablet, Take 1 tablet (25 mcg) by mouth daily, Disp: 90 tablet, Rfl: 3     lisinopril (PRINIVIL/ZESTRIL) 10 MG tablet, Take 1 tablet (10 mg) by mouth daily, Disp: 90 tablet, Rfl: 3     Loratadine (CLARITIN PO), Take by mouth daily Reported on 4/18/2017, Disp: , Rfl:      triamcinolone (KENALOG) 0.1 % external cream, Apply 1 inch to affected areas twice daily for 10 days., Disp: 30 g, Rfl: 3     ciprofloxacin-dexamethasone (CIPRODEX) 0.3-0.1 % otic suspension, Place 5 drops in draining ear twice daily for 10 days.  Call if drainage persistent past 10 days. (Patient not taking: Reported on 10/30/2019), Disp: 10 mL, Rfl: 3    Allergies  Allergies   Allergen Reactions     Dust Mites      Pollen Extract        Social History  Social History     Socioeconomic History     Marital status:      Spouse name: Not on file     Number of children: Not on file     Years of education: Not on file     Highest education level: Not on file   Occupational History     Not on file   Social Needs     Financial resource strain: Not on file     Food insecurity:     Worry: Not on file     Inability: Not on file     Transportation needs:     Medical: Not on file     Non-medical: Not on file   Tobacco Use     Smoking status: Never Smoker     Smokeless tobacco: Never Used   Substance and Sexual Activity     Alcohol use: Yes     Comment: occasional     Drug use: No     Sexual activity: Not Currently     Partners: Male     Birth control/protection: Pill   Lifestyle     Physical activity:     Days per week: Not on file     Minutes per session: Not on file     Stress: Not on file   Relationships     Social connections:     Talks on phone: Not on file     Gets together: Not on file     Attends Catholic service: Not on file     Active member of club or organization: Not on file     Attends meetings of clubs or organizations: Not on file     Relationship status: Not on file     Intimate partner  "violence:     Fear of current or ex partner: Not on file     Emotionally abused: Not on file     Physically abused: Not on file     Forced sexual activity: Not on file   Other Topics Concern      Service No     Blood Transfusions No     Caffeine Concern Yes     Comment: 2 cans a week     Occupational Exposure No     Hobby Hazards No     Sleep Concern No     Stress Concern Yes     Weight Concern Yes     Special Diet Yes     Comment: occasionally     Back Care No     Exercise No     Bike Helmet No     Seat Belt Yes     Self-Exams Yes     Parent/sibling w/ CABG, MI or angioplasty before 65F 55M? No   Social History Narrative     Not on file       Family History  Family History   Problem Relation Age of Onset     Other - See Comments Father         fluid in lungs     Hypertension Father      Skin Cancer Father      Hypertension Sister      Hyperlipidemia Sister      Hypertension Brother      Glaucoma Maternal Grandmother      Skin Cancer Maternal Grandfather      Crohn's Disease Son      Allergies Son         eczema       Review of Systems  As per HPI and PMHx, otherwise 10 system review including the head and neck, constitutional, eyes, respiratory, GI, skin, neurologic, lymphatic, endocrine, and allergy systems is negative.    Physical Exam  /81 (BP Location: Left arm, Patient Position: Sitting, Cuff Size: Adult Large)   Pulse 78   Temp 97.8  F (36.6  C) (Oral)   Ht 1.689 m (5' 6.5\")   BMI 36.25 kg/m     GENERAL: Patient is a pleasant, cooperative 49 year old female in no acute distress.  HEAD: Normocephalic, atraumatic.  Hair and scalp are normal.  EYES: Pupils are equal, round, reactive to light and accommodation.  Extraocular movements are intact.  The sclera nonicteric without injection.  The extraocular structures are normal.  EARS: Normal shape and symmetry.  No tenderness when palpating the mastoid or tragal areas bilaterally.  Patient  has had significant improvement in the flakiness/crusting " of the external pinna on the right.  Otoscopic exam on the right reveals a clear canal, intact tympanic membrane without evidence of effusion, good landmarks.  No retraction, granulation, drainage, or evidence of cholesteatoma.  Attention was turned to the left ear.     No significant crusting or flaking of the external auditory canal or pinna.  Otoscopic exam on the left reveals a clear canal without any moisture.  There is normal ceruminous debris.  The left tympanic membrane was round, intact without evidence of effusion.  No retraction, granulation, drainage, or evidence of cholesteatoma.   NOSE: Nares are patent.  Nasal mucosa is pink and moist.  Negative anterior rhinoscopy.  NEUROLOGIC: Cranial nerves II through XII are grossly intact.  Voice is strong.  Patient is House-Brackman I/VI bilaterally.  CARDIOVASCULAR: Extremities are warm and well-perfused.  No significant peripheral edema.  RESPIRATORY: Patient has nonlabored breathing without cough, wheeze, stridor.  PSYCHIATRIC: Patient is alert and oriented.  Mood and affect appear normal.  SKIN: Warm and dry.  No scalp, face, or neck lesions noted.    Audiogram  The patient underwent an audiogram performed today.  My review of the audiogram shows normal hearing across all tested frequencies.  Pure-tone average is 5 dB on the right and 7 dB on the left.  Speech reception threshhold is 5 dB on the right and 5 dB on the left.  The patient had 100% word recognition on the right and 100% word recognition on the left.  The patient had a type A tympanogram on the right and a type A tympanogram on the left.     Assessment and Plan     ICD-10-CM    1. Eczema of external ear, bilateral H60.543 AUDIOLOGY ADULT REFERRAL   2. History of acute otitis externa Z86.69 AUDIOLOGY ADULT REFERRAL      It was my pleasure seeing Piper Unger today in clinic.  Her ears look much improved on examination today.  I would recommend that she return in 4 to 6 months for repeat ear  check.  She can always return sooner if she is having problems or concerns.  We discussed using the drops/external steroid cream intermittently if she is having symptoms.  I be happy to refill her medications in the future if she is having symptoms.    Darron Herrera MD  Department of Otolarygology-Head and Neck Surgery  [facility]        Again, thank you for allowing me to participate in the care of your patient.        Sincerely,        Darron Herrera MD

## 2019-10-30 NOTE — PATIENT INSTRUCTIONS
Per physician instructions      If you have questions or concerns on any instructions given to you by your provider today or if you need to schedule an appointment, you can reach us at [phone].

## 2019-10-30 NOTE — PROGRESS NOTES
AUDIOLOGY REPORT    SUMMARY: Audiology visit completed. See audiogram for results.      RECOMMENDATIONS: Follow-up with ENT.    Angélica Kimball.  Clinical Audiologist, [place] #[id number]

## 2019-11-03 ENCOUNTER — HEALTH MAINTENANCE LETTER (OUTPATIENT)
Age: 49
End: 2019-11-03

## 2020-06-25 DIAGNOSIS — L21.9 DERMATITIS, SEBORRHEIC: ICD-10-CM

## 2020-06-26 RX ORDER — KETOCONAZOLE 20 MG/ML
SHAMPOO TOPICAL
Qty: 120 ML | Refills: 3 | Status: SHIPPED | OUTPATIENT
Start: 2020-06-26 | End: 2022-03-14

## 2020-06-26 NOTE — TELEPHONE ENCOUNTER
Routing refill request to provider for review/approval because:  Drug interaction warning    Ida Duron RN

## 2020-09-15 DIAGNOSIS — I10 ESSENTIAL HYPERTENSION: ICD-10-CM

## 2020-09-15 RX ORDER — LISINOPRIL 10 MG/1
10 TABLET ORAL DAILY
Qty: 90 TABLET | Refills: 0 | Status: SHIPPED | OUTPATIENT
Start: 2020-09-15 | End: 2020-12-18

## 2020-09-15 NOTE — TELEPHONE ENCOUNTER
"Requested Prescriptions   Pending Prescriptions Disp Refills     lisinopril (ZESTRIL) 10 MG tablet 90 tablet 3     Sig: Take 1 tablet (10 mg) by mouth daily       ACE Inhibitors (Including Combos) Protocol Passed - 9/15/2020 11:24 AM        Passed - Blood pressure under 140/90 in past 12 months     BP Readings from Last 3 Encounters:   10/30/19 130/81   10/07/19 123/82   09/23/19 (!) 148/86                 Passed - Recent (12 mo) or future (30 days) visit within the authorizing provider's specialty     Patient has had an office visit with the authorizing provider or a provider within the authorizing providers department within the previous 12 mos or has a future within next 30 days. See \"Patient Info\" tab in inbasket, or \"Choose Columns\" in Meds & Orders section of the refill encounter.              Passed - Medication is active on med list        Passed - Patient is age 18 or older        Passed - No active pregnancy on record        Passed - Normal serum creatinine on file in past 12 months     Recent Labs   Lab Test 09/23/19  1445   CR 0.73       Ok to refill medication if creatinine is low          Passed - Normal serum potassium on file in past 12 months     Recent Labs   Lab Test 09/23/19  1445   POTASSIUM 3.8             Passed - No positive pregnancy test within past 12 months             "

## 2020-09-28 ENCOUNTER — OFFICE VISIT (OUTPATIENT)
Dept: LAB | Facility: SCHOOL | Age: 50
End: 2020-09-28
Payer: COMMERCIAL

## 2020-09-28 VITALS
HEIGHT: 67 IN | DIASTOLIC BLOOD PRESSURE: 76 MMHG | HEART RATE: 87 BPM | TEMPERATURE: 99.5 F | BODY MASS INDEX: 33.59 KG/M2 | RESPIRATION RATE: 16 BRPM | WEIGHT: 214 LBS | SYSTOLIC BLOOD PRESSURE: 110 MMHG | OXYGEN SATURATION: 98 %

## 2020-09-28 DIAGNOSIS — L30.9 ECZEMA, UNSPECIFIED TYPE: ICD-10-CM

## 2020-09-28 DIAGNOSIS — Z00.00 WELLNESS EXAMINATION: Primary | ICD-10-CM

## 2020-09-28 PROCEDURE — 99213 OFFICE O/P EST LOW 20 MIN: CPT

## 2020-09-28 RX ORDER — TRIAMCINOLONE ACETONIDE 1 MG/G
CREAM TOPICAL 2 TIMES DAILY
Qty: 30 G | Refills: 1 | Status: SHIPPED | OUTPATIENT
Start: 2020-09-28 | End: 2020-12-18

## 2020-09-28 ASSESSMENT — MIFFLIN-ST. JEOR: SCORE: 1613.33

## 2020-09-28 NOTE — PROGRESS NOTES
"  SUBJECTIVE:  CC: Piper Unger is an 50 year old woman who presents for Wellness Check at [id number].     Review of Healthy Lifestyle:    Do you get at least three servings of calcium containing foods daily (dairy, green leafy vegetables, etc.)? yes     Do you have a high-fiber diet? yes     Amount of exercise or daily activities, outside of work: limited    Do you wear sunscreen on a regular basis? No     Are you taking your medications regularly Yes     Have you had an eye exam in the past two years? yes    Do you see a dentist twice per year? yes    Do you have sleep apnea, excessive snoring or excessive daytime drowsiness? Yes, excessive snoring    Do you use tobacco in any form? no     Patient has a patch of eczema behind her left ear and would like a refill on her triamcinolone cream for treatment.    OBJECTIVE:    Vitals: /76   Pulse 87   Temp 99.5  F (37.5  C) (Tympanic)   Resp 16   Ht 1.702 m (5' 7\")   Wt 97.1 kg (214 lb)   SpO2 98%   BMI 33.52 kg/m    BMI= Body mass index is 33.52 kg/m .    SKIN:  Patient has scaling rash behind the left ear and where it attaches.    HEARING: Right Ear:        500Hz:  RESPONSE- on Level: tone not heard   1000 Hz: RESPONSE- on Level: tone not heard   2000 Hz: RESPONSE- on Level: 25 db   4000 Hz: RESPONSE- on Level: 25 db    Left Ear:       500Hz:  RESPONSE- on Level: 40 db   1000 Hz: RESPONSE- on Level: 25 db   2000 Hz: RESPONSE- on Level:   20 db    4000 Hz: RESPONSE- on Level:   20 db     VISION:    Corrective lenses?  Yes, wears glasses for distance, has had an exam in the past year     Medication Reconciliation: complete    ASSESSMENT/PLAN:    Wellness exam  Patient is here today for wellness examination.  Patient was given information on recommendations for health, preventative care, diet and lifestyle changes for her health.  No referrals needed today.  Recommended preventative visit and medication refill " "appointment either here or at her primary clinic.    Eczema, unspecified type  Refilled triamcinolone cream for treatment.    COUNSELING:      reports that she has never smoked. She has never used smokeless tobacco.    Estimated body mass index is 33.52 kg/m  as calculated from the following:    Height as of this encounter: 1.702 m (5' 7\").    Weight as of this encounter: 97.1 kg (214 lb).   Weight management plan: Discussed healthy diet and exercise guidelines    Azeb Wells NP on 9/28/2020 at 3:06 PM  [facility]     "

## 2020-09-28 NOTE — PATIENT INSTRUCTIONS
"                Piper Unger has completed a Wellness Check at the [facility] on 9/28/2020.      ____________________________________________  [place] [employer] PROVIDER                                                                               Wellness Visit Recommendations:      See your regular primary care health provider every year in order to help stay healthy.    Review health changes.     Review your medicines if your doctor has prescribed any.    Talk to your provider about how often to have your cholesterol checked.    If you are at risk for diabetes, you should have a diabetes test (fasting glucose).    Shots: Get a flu shot each year. Get a tetanus shot every 10 years.     Review with your primary care provider other immunizations that you may need based on your age and/or medical/surgical history    Nutrition:     Eat at least 5 servings of fruits and vegetables each day.    Eat whole-grain bread, whole-wheat pasta and brown rice instead of white grains and rice.    Preventive Care to be reviewed by your primary care provider:    Females:        Cervical Cancer Screening                          Breast Cancer Screening                          Colon Cancer Screening  Males:             Prostrate Cancer Screening                          Colon Cancer Screening      Lifestyle:    Exercise at least 150 minutes a week (30 minutes a day, 5 days of the week). This will help you control your weight and help prevent disease or manage disease.    Limit alcohol to one drink per day or less depending on your past medical history.    No smoking.     Wear sunscreen to prevent skin cancer.    See your dentist every six months for an exam and cleaning.    Today's Vital Signs:  /76   Pulse 87   Temp 99.5  F (37.5  C) (Tympanic)   Resp 16   Ht 1.702 m (5' 7\")   Wt 97.1 kg (214 lb)   SpO2 98%   BMI 33.52 kg/m    "

## 2020-12-09 DIAGNOSIS — I10 ESSENTIAL HYPERTENSION: ICD-10-CM

## 2020-12-09 RX ORDER — LISINOPRIL 10 MG/1
TABLET ORAL
Qty: 90 TABLET | Refills: 0 | OUTPATIENT
Start: 2020-12-09

## 2020-12-09 NOTE — TELEPHONE ENCOUNTER
"Requested Prescriptions   Pending Prescriptions Disp Refills     lisinopril (ZESTRIL) 10 MG tablet [Pharmacy Med Name: LISINOPRIL 10MG TABLET] 90 tablet 0     Sig: TAKE 1 TABLET BY MOUTH DAILY       ACE Inhibitors (Including Combos) Protocol Failed - 12/9/2020  1:10 AM        Failed - Normal serum creatinine on file in past 12 months     Recent Labs   Lab Test 09/23/19  1445   CR 0.73       Ok to refill medication if creatinine is low          Failed - Normal serum potassium on file in past 12 months     Recent Labs   Lab Test 09/23/19  1445   POTASSIUM 3.8             Passed - Blood pressure under 140/90 in past 12 months     BP Readings from Last 3 Encounters:   09/28/20 110/76   10/30/19 130/81   10/07/19 123/82                 Passed - Recent (12 mo) or future (30 days) visit within the authorizing provider's specialty     Patient has had an office visit with the authorizing provider or a provider within the authorizing providers department within the previous 12 mos or has a future within next 30 days. See \"Patient Info\" tab in inbasket, or \"Choose Columns\" in Meds & Orders section of the refill encounter.              Passed - Medication is active on med list        Passed - Patient is age 18 or older        Passed - No active pregnancy on record        Passed - No positive pregnancy test within past 12 months             "

## 2020-12-18 ENCOUNTER — OFFICE VISIT (OUTPATIENT)
Dept: LAB | Facility: SCHOOL | Age: 50
End: 2020-12-18
Payer: COMMERCIAL

## 2020-12-18 VITALS
TEMPERATURE: 99.2 F | DIASTOLIC BLOOD PRESSURE: 76 MMHG | SYSTOLIC BLOOD PRESSURE: 112 MMHG | OXYGEN SATURATION: 97 % | WEIGHT: 219.8 LBS | HEIGHT: 67 IN | HEART RATE: 88 BPM | BODY MASS INDEX: 34.5 KG/M2

## 2020-12-18 DIAGNOSIS — E66.01 MORBID OBESITY (H): ICD-10-CM

## 2020-12-18 DIAGNOSIS — L30.9 ECZEMA, UNSPECIFIED TYPE: ICD-10-CM

## 2020-12-18 DIAGNOSIS — E78.5 HYPERLIPIDEMIA LDL GOAL <130: ICD-10-CM

## 2020-12-18 DIAGNOSIS — I10 BENIGN ESSENTIAL HYPERTENSION: ICD-10-CM

## 2020-12-18 DIAGNOSIS — Z00.00 PREVENTATIVE HEALTH CARE: Primary | ICD-10-CM

## 2020-12-18 DIAGNOSIS — Z11.4 SCREENING FOR HIV (HUMAN IMMUNODEFICIENCY VIRUS): ICD-10-CM

## 2020-12-18 DIAGNOSIS — E03.9 ACQUIRED HYPOTHYROIDISM: ICD-10-CM

## 2020-12-18 DIAGNOSIS — Z12.31 ENCOUNTER FOR SCREENING MAMMOGRAM FOR BREAST CANCER: ICD-10-CM

## 2020-12-18 DIAGNOSIS — Z11.59 ENCOUNTER FOR HEPATITIS C SCREENING TEST FOR LOW RISK PATIENT: ICD-10-CM

## 2020-12-18 LAB
ANION GAP SERPL CALCULATED.3IONS-SCNC: 8 MMOL/L (ref 3–14)
BUN SERPL-MCNC: 15 MG/DL (ref 7–30)
CALCIUM SERPL-MCNC: 8.8 MG/DL (ref 8.5–10.1)
CHLORIDE SERPL-SCNC: 104 MMOL/L (ref 94–109)
CHOLEST SERPL-MCNC: 292 MG/DL
CO2 SERPL-SCNC: 25 MMOL/L (ref 20–32)
CREAT SERPL-MCNC: 0.63 MG/DL (ref 0.52–1.04)
GFR SERPL CREATININE-BSD FRML MDRD: >90 ML/MIN/{1.73_M2}
GLUCOSE SERPL-MCNC: 98 MG/DL (ref 70–99)
HDLC SERPL-MCNC: 55 MG/DL
LDLC SERPL CALC-MCNC: 192 MG/DL
NONHDLC SERPL-MCNC: 237 MG/DL
POTASSIUM SERPL-SCNC: 4 MMOL/L (ref 3.4–5.3)
SODIUM SERPL-SCNC: 137 MMOL/L (ref 133–144)
TRIGL SERPL-MCNC: 223 MG/DL
TSH SERPL DL<=0.005 MIU/L-ACNC: 1.59 MU/L (ref 0.4–4)

## 2020-12-18 PROCEDURE — 99214 OFFICE O/P EST MOD 30 MIN: CPT

## 2020-12-18 PROCEDURE — 80048 BASIC METABOLIC PNL TOTAL CA: CPT | Performed by: NURSE PRACTITIONER

## 2020-12-18 PROCEDURE — 84443 ASSAY THYROID STIM HORMONE: CPT | Performed by: NURSE PRACTITIONER

## 2020-12-18 PROCEDURE — 36415 COLL VENOUS BLD VENIPUNCTURE: CPT | Performed by: NURSE PRACTITIONER

## 2020-12-18 PROCEDURE — 86803 HEPATITIS C AB TEST: CPT | Performed by: NURSE PRACTITIONER

## 2020-12-18 PROCEDURE — 87389 HIV-1 AG W/HIV-1&-2 AB AG IA: CPT | Performed by: NURSE PRACTITIONER

## 2020-12-18 PROCEDURE — 80061 LIPID PANEL: CPT | Performed by: NURSE PRACTITIONER

## 2020-12-18 RX ORDER — LISINOPRIL 10 MG/1
10 TABLET ORAL DAILY
Qty: 90 TABLET | Refills: 3 | Status: SHIPPED | OUTPATIENT
Start: 2020-12-18 | End: 2022-01-03

## 2020-12-18 RX ORDER — TRIAMCINOLONE ACETONIDE 1 MG/G
CREAM TOPICAL 2 TIMES DAILY
Qty: 30 G | Refills: 1 | Status: SHIPPED | OUTPATIENT
Start: 2020-12-18 | End: 2022-03-14

## 2020-12-18 ASSESSMENT — MIFFLIN-ST. JEOR: SCORE: 1639.64

## 2020-12-18 NOTE — PATIENT INSTRUCTIONS
Lifestyle changes that can lower blood pressure include:  Limiting sodium in the diet.  Goal of <2.3 grams (2300 mg) daily.  Avoiding salty and prepared foods and not adding salt at the table can help.   Regular moderate exercise at least 150 minutes per week (30 minutes per day 5 days per week) plus muscle strengthening exercise at least 2 days per week.   Weight loss can help even if not dramatic or down to normal BMI range.  DASH type diet can actually lower blood pressure. You can find multiple resources for this on the internet.  Cutting back on alcohol can help for women drinking more than a drink per day and men more than 2 drinks per day on average.   Quitting smoking can help reduce your risk of heart attack or stroke.    A good resource for lifestyle changes is https://www.cardiosmart.org/topics/healthy-living

## 2020-12-18 NOTE — PROGRESS NOTES
Subjective     Piper Unger is a 50 year old female who presents to clinic today for the following health issues:    HPI         Hypertension Follow-up      Do you check your blood pressure regularly outside of the clinic? No     Are you following a low salt diet? No    Are your blood pressures ever more than 140 on the top number (systolic) OR more   than 90 on the bottom number (diastolic), for example 140/90? Yes      How many servings of fruits and vegetables do you eat daily?  2-3    On average, how many sweetened beverages do you drink each day (Examples: soda, juice, sweet tea, etc.  Do NOT count diet or artificially sweetened beverages)?   0    How many days per week do you exercise enough to make your heart beat faster? 3 or less    How many minutes a day do you exercise enough to make your heart beat faster? 9 or less    How many days per week do you miss taking your medication? 0    Hypothyroidism Follow-up      Since last visit, patient describes the following symptoms: Weight stable, no hair loss, no skin changes, no constipation, no loose stools      How many servings of fruits and vegetables do you eat daily?  2-3    On average, how many sweetened beverages do you drink each day (Examples: soda, juice, sweet tea, etc.  Do NOT count diet or artificially sweetened beverages)?   0    How many days per week do you exercise enough to make your heart beat faster? 3 or less    How many minutes a day do you exercise enough to make your heart beat faster? 9 or less    How many days per week do you miss taking your medication? 0    Above HPI reviewed. Additionally, TSH, T4 checked in 2019 due to fatigue. TSH was normal, T4 was slightly low. Was started on levothyroxine 25mcg. She did not feel it made a difference in her symptoms, so she stopped taking it about 6 months ago.    Last lipid panel in 2017, LDL was 171. ASCVD risk was calculated at 2.9% at that time.     Needs refills of triamcinolone cream  "which she uses prn for eczema as well as ketoconazole shampoo which she uses to the back of her neck on occasion.      Review of Systems   Constitutional, HEENT, cardiovascular, pulmonary, GI, , musculoskeletal, neuro, skin, endocrine and psych systems are negative, except as otherwise noted.      Objective    /76 (BP Location: Right arm, Patient Position: Sitting, Cuff Size: Adult Regular)   Pulse 88   Temp 99.2  F (37.3  C) (Tympanic)   Ht 1.702 m (5' 7\")   Wt 99.7 kg (219 lb 12.8 oz)   SpO2 97%   BMI 34.43 kg/m    Body mass index is 34.43 kg/m .  Physical Exam   GENERAL: healthy, alert and no distress  EYES: Eyes grossly normal to inspection, PERRL and conjunctivae and sclerae normal  HENT: ear canals and TM's normal, nose and mouth without ulcers or lesions  NECK: no adenopathy, no asymmetry, masses, or scars and thyroid normal to palpation  RESP: lungs clear to auscultation - no rales, rhonchi or wheezes  BREAST: normal without masses, tenderness or nipple discharge and no palpable axillary masses or adenopathy  CV: regular rate and rhythm, normal S1 S2, no S3 or S4, no murmur, click or rub, no peripheral edema and peripheral pulses strong  MS: no gross musculoskeletal defects noted, no edema  SKIN: no suspicious lesions or rashes  NEURO: Normal strength and tone, mentation intact and speech normal  PSYCH: mentation appears normal, affect normal/bright    No results found for this or any previous visit (from the past 24 hour(s)).        Assessment & Plan     Preventative health care  Declines flu vaccine.    Benign essential hypertension  Stable on lisinopril. Refilled medication.  - Basic metabolic panel  - lisinopril (ZESTRIL) 10 MG tablet; Take 1 tablet (10 mg) by mouth daily    Acquired hypothyroidism  Will recheck TSH with T4 reflex as she is no longer symptomatic and not taking medication.  - TSH with free T4 reflex    Hyperlipidemia LDL goal <130  Will recheck lipid panel and reassess ASCVD " risk once results are available.   - Lipid panel reflex to direct LDL Fasting    Eczema, unspecified type  Medication refilled.  - triamcinolone (KENALOG) 0.1 % external cream; Apply topically 2 times daily For up to 2 weeks, then take one week off and repeat as needed    Encounter for screening mammogram for breast cancer    - MA Screen Bilateral w/Jaylen; Future    Encounter for hepatitis C screening test for low risk patient    - Hepatitis C antibody    Screening for HIV (human immunodeficiency virus)    - HIV Antigen Antibody Combo    Morbid obesity (H)            Patient Instructions   Lifestyle changes that can lower blood pressure include:  Limiting sodium in the diet.  Goal of <2.3 grams (2300 mg) daily.  Avoiding salty and prepared foods and not adding salt at the table can help.   Regular moderate exercise at least 150 minutes per week (30 minutes per day 5 days per week) plus muscle strengthening exercise at least 2 days per week.   Weight loss can help even if not dramatic or down to normal BMI range.  DASH type diet can actually lower blood pressure. You can find multiple resources for this on the internet.  Cutting back on alcohol can help for women drinking more than a drink per day and men more than 2 drinks per day on average.   Quitting smoking can help reduce your risk of heart attack or stroke.    A good resource for lifestyle changes is https://www.cardiosmart.org/topics/healthy-living        Return in about 1 year (around 12/18/2021) for Routine Visit.    formerly [facility]

## 2020-12-20 LAB
HCV AB SERPL QL IA: NONREACTIVE
HIV 1+2 AB+HIV1 P24 AG SERPL QL IA: NONREACTIVE

## 2021-05-26 ENCOUNTER — OFFICE VISIT (OUTPATIENT)
Dept: LAB | Facility: SCHOOL | Age: 51
End: 2021-05-26
Payer: COMMERCIAL

## 2021-05-26 VITALS
HEART RATE: 72 BPM | OXYGEN SATURATION: 98 % | WEIGHT: 220 LBS | SYSTOLIC BLOOD PRESSURE: 120 MMHG | RESPIRATION RATE: 16 BRPM | HEIGHT: 67 IN | TEMPERATURE: 98.1 F | BODY MASS INDEX: 34.53 KG/M2 | DIASTOLIC BLOOD PRESSURE: 82 MMHG

## 2021-05-26 DIAGNOSIS — H60.312 CHRONIC DIFFUSE OTITIS EXTERNA OF LEFT EAR: ICD-10-CM

## 2021-05-26 DIAGNOSIS — L30.9 PERIORBITAL DERMATITIS: Primary | ICD-10-CM

## 2021-05-26 PROCEDURE — 87101 SKIN FUNGI CULTURE: CPT | Performed by: NURSE PRACTITIONER

## 2021-05-26 PROCEDURE — 87077 CULTURE AEROBIC IDENTIFY: CPT | Performed by: NURSE PRACTITIONER

## 2021-05-26 PROCEDURE — 87186 SC STD MICRODIL/AGAR DIL: CPT | Performed by: NURSE PRACTITIONER

## 2021-05-26 PROCEDURE — 99213 OFFICE O/P EST LOW 20 MIN: CPT

## 2021-05-26 PROCEDURE — 87070 CULTURE OTHR SPECIMN AEROBIC: CPT | Performed by: NURSE PRACTITIONER

## 2021-05-26 RX ORDER — DESONIDE 0.5 MG/G
CREAM TOPICAL 2 TIMES DAILY
Qty: 15 G | Refills: 1 | Status: SHIPPED | OUTPATIENT
Start: 2021-05-26 | End: 2023-04-17

## 2021-05-26 ASSESSMENT — MIFFLIN-ST. JEOR: SCORE: 1635.54

## 2021-05-26 NOTE — PROGRESS NOTES
"    Assessment & Plan     Periorbital dermatitis  Can try low potency topical steroid, discussed pulsed taper use, also discussed skin care, emollient creams.  - desonide (DESOWEN) 0.05 % external cream; Apply topically 2 times daily    Chronic diffuse otitis externa of left ear  Will culture before starting treatment given previous results.  - Fungus skin hair nail culture  - Ear Culture Aerobic Bacterial         BMI:   Estimated body mass index is 34.46 kg/m  as calculated from the following:    Height as of this encounter: 1.702 m (5' 7\").    Weight as of this encounter: 99.8 kg (220 lb).       Patient Instructions   Use the steroid cream around your eyes. Do not use longer than 2 weeks, then break for one week.   Use Cerave or Eucerin cream around your eyes.  Continue Claritin  May consider dermatology follow up if not improving.  Do not use makeup til clear.  Will call with culture results from ear.      Return in about 1 week (around 6/2/2021) for worsening or continued symptoms.    [facility]     Antonio Saldaña is a 51 year old who presents for the following health issues     HPI     Chief Complaint   Patient presents with     Derm Problem     derm issue around eyes x 1 month - dry, itchy, burns with creams, peeling , red and sore      Ear Problem     ear feel pressure in ear and rash at backof ear, itchy inside and out x ongoing     Above HPI reviewed. Additionally, has had history of seborrheic dermatitis that is treated with ketoconazole shampoo behind both ears ongoing for quite some time, this has recently flared and has restarted ketoconazole. Developed rash surrounding both eyes, scaly, sometimes burns. Pruritic. Does also have history of eczema. Denies new detergents, soaps, products.     Also having feeling of fullness, itching in both ears. History of chronic OE, eczema left ear. Culture in 2019 grew out MRSA, strep agalactiae which was " "treated with clindamycin, then ciprodex. Feels somewhat similar, however no fevers, otorrhea or pain.       Review of Systems   Constitutional, HEENT, cardiovascular, pulmonary, gi and gu systems are negative, except as otherwise noted.      Objective    /82 (BP Location: Right arm, Patient Position: Sitting, Cuff Size: Adult Large)   Pulse 72   Temp 98.1  F (36.7  C) (Tympanic)   Resp 16   Ht 1.702 m (5' 7\")   Wt 99.8 kg (220 lb)   LMP 04/19/2021 (Approximate)   SpO2 98%   Breastfeeding No   BMI 34.46 kg/m    Body mass index is 34.46 kg/m .  Physical Exam  Vitals signs and nursing note reviewed.   Constitutional:       General: She is not in acute distress.     Appearance: Normal appearance.   HENT:      Head: Normocephalic and atraumatic.      Ears:      Comments: Scaling noted over bilateral auricles, over bilateral mastoids. Right EAC with mild scaling noted. Left EAC without significant swelling, mild erythema, some scaling and scant amount of discharge. No pain with movement of auricle or with palpation of tragus.     Mouth/Throat:      Mouth: Mucous membranes are moist.   Neck:      Musculoskeletal: Neck supple.   Cardiovascular:      Rate and Rhythm: Normal rate.   Pulmonary:      Effort: Pulmonary effort is normal.   Skin:     General: Skin is warm and dry.      Comments: Mild erythema with fine scale to bilateral upper and lower eyelids   Neurological:      General: No focal deficit present.      Mental Status: She is alert.   Psychiatric:         Mood and Affect: Mood normal.         Behavior: Behavior normal.              "

## 2021-05-26 NOTE — PATIENT INSTRUCTIONS
Use the steroid cream around your eyes. Do not use longer than 2 weeks, then break for one week.   Use Cerave or Eucerin cream around your eyes.  Continue Claritin  May consider dermatology follow up if not improving.  Do not use makeup til clear.  Will call with culture results from ear.

## 2021-05-28 ENCOUNTER — TELEPHONE (OUTPATIENT)
Dept: LAB | Facility: SCHOOL | Age: 51
End: 2021-05-28

## 2021-05-28 ENCOUNTER — MYC MEDICAL ADVICE (OUTPATIENT)
Dept: FAMILY MEDICINE | Facility: CLINIC | Age: 51
End: 2021-05-28

## 2021-05-28 DIAGNOSIS — H60.62 CHRONIC OTITIS EXTERNA OF LEFT EAR, UNSPECIFIED TYPE: Primary | ICD-10-CM

## 2021-05-28 NOTE — TELEPHONE ENCOUNTER
Please see MyChart message from pt.  She was seen in clinic at [facility] on 5/26/21 by Tresa Mcmanus.  Ear culture, pre-doll demonstrates bacteria.    Chrissy Porter RN

## 2021-05-28 NOTE — TELEPHONE ENCOUNTER
[facility] [place] [address] Emergency Department/Urgent Care Lab result notification     Patient/parent Name  Piper    RN Assessment (Patient s current Symptoms), include time called.  [Insert Left message here if message left]  4:00 Patient returning call to ED lab results team regarding ear culture from [place] [address] clinic. Patient reports no changes to her ear. She will contact the provider at the clinic for further treatment recommendations. Did relay to patient result is preliminary at this time.  RN Recommendations/Instructions per [facility] ED lab result protocol  Patient notified of lab result and treatment recommendations.    Please Contact your PCP clinic or return to the Emergency department if your:    Symptoms return.    Symptoms worsen or other concerning symptom's.        Sharri Goetz  [facility] [place]  Emergency Dept Lab Result RN  Ph# [phone]

## 2021-05-28 NOTE — TELEPHONE ENCOUNTER
[facility]  Emergency Department Lab result notification:    Reason for call  Preliminary ear culture  Lab Result  Preliminary EAR culture report on  shows the presence of bacteria(s): Moderate growth   Staphylococcus aureus and Moderate growth   Streptococcus agalactiae sero group B   [facility]  discharge antibiotic [if prescribed]: None  ED visit Date: 5/26/21  Symptoms reported at ED visit Derm Problem        derm issue around eyes x 1 month - dry, itchy, burns with creams, peeling , red and sore      Ear Problem       ear feel pressure in ear and rash at backof ear, itchy inside and out x ongoing      Above HPI reviewed. Additionally, has had history of seborrheic dermatitis that is treated with ketoconazole shampoo behind both ears ongoing for quite some time, this has recently flared and has restarted ketoconazole. Developed rash surrounding both eyes, scaly, sometimes burns. Pruritic. Does also have history of eczema. Denies new detergents, soaps, products.      Also having feeling of fullness, itching in both ears. History of chronic OE, eczema left ear. Culture in 2019 grew out MRSA, strep agalactiae which was treated with clindamycin, then ciprodex. Feels somewhat similar, however no fevers, otorrhea or pain.         Miscellaneous information      Current symptoms  3:00 Left voicemail message requesting a call back to [facility] ED Lab Result RN at [phone].  RN is available every day between 9 a.m. and 5:30 p.m.    Sharri Goetz  [facility] [place]  Emergency Dept Lab Result RN  Ph# [phone]     Copy of Lab result   Contains abnormal data Ear Culture Aerobic Bacterial  Order: [id number]  Status:  Preliminary result   Visible to patient:  No (not released) Dx:  Chronic diffuse otitis externa of lef...  Specimen Information: Left ear        Component 2d ago   Specimen Description Left Ear    Special  Requests Specimen collected in eSwab transport (white cap) P    Culture Micro Abnormal   Moderate growth   Staphylococcus aureus   P      Culture Micro Abnormal   Moderate growth   Streptococcus agalactiae sero group B   This organism is susceptible to ampicillin, penicillin, vancomycin and the cephalosporins.    If treatment is required AND your patient is allergic to penicillin, contact the   Microbiology Lab within 5 days to request susceptibility testing.   P      Culture Micro Culture in progress P    Resulting Agency [email or web address]         Specimen Collected: 05/26/21  9:27 AM Last Resulted: 05/28/21  1:44 PM

## 2021-05-29 ENCOUNTER — HEALTH MAINTENANCE LETTER (OUTPATIENT)
Age: 51
End: 2021-05-29

## 2021-05-29 LAB
BACTERIA SPEC CULT: ABNORMAL
Lab: ABNORMAL
SPECIMEN SOURCE: ABNORMAL

## 2021-05-30 NOTE — TELEPHONE ENCOUNTER
[facility]      Patient/parent Name  Piper    Reason for call:   Notify of lab result    Lab Result    Final EAR culture on report on 5/29/21.  Bacteria's present: Moderate growth Methicillin resistant Staphylococcus aureus (MRSA) AND Moderate growth Streptococcus agalactiae sero group B AND Light growth Strain 2 Methicillin resistant Staphylococcus aureus (MRSA) AND Moderate growth Staphylococcus capitis       RN Assessment (Patient s current Symptoms), include time called.  [Insert Left message here if message left]  12:12PM: Left voicemail message requesting a call back to [facility] ED Lab Result RN at [phone].  RN is available every day between 9 a.m. and 5:30 p.m.    Cristel Bowen RN  [facility] RN  Lung Nodule and ED Lab Result RN  Epic pool (ED late result f/u RN): [id number]  [facility] INCIDENTAL RADIOLOGY F/U NURSES: P [id number]  # [phone]

## 2021-05-31 NOTE — TELEPHONE ENCOUNTER
"[facility]  Clinic Lab result notification     Patient/parent Name  Piper    RN Assessment (Patient s current Symptoms), include time called.  [Insert Left message here if message left]  9:19AM: Patient returned call. States that her ears are \"itchy\" and \"feel alittle warm\". The patient states that she has been using the clindamycin lotion that she had left over from the last time she had MRSA in her ear.   Lab result (if applicable):  Final EAR culture on report on 5/29/21.  Bacteria's present: Moderate growth Methicillin resistant Staphylococcus aureus (MRSA) AND Moderate growth Streptococcus agalactiae sero group B AND Light growth Strain 2 Methicillin resistant Staphylococcus aureus (MRSA) AND Moderate growth Staphylococcus capitis    RN Recommendations/Instructions per [facility] ED lab result protocol  Patient notified of lab result.   Advised to contact her clinic provider tomorrow to discuss treatment options, go to urgent care today if feeling worse.  RN reviewed information about MRSA from Epic reference.   Advised to wash hands frequently.   Do not share any personal items such as towels, washcloths, razors or clothing.  Wash sheets, bedding, towels, etc in hot water with laundry detergent with liquid bleach and use a hot dryer.   Wipe down commonly touched surfaces with a bleach solution.  The patient is comfortable with the information given and has no further questions.     Please Contact your PCP clinic or return to the Emergency department if your:    Symptoms worsen or other concerning symptom's.    PCP follow-up Questions asked: YES       Cristel Bowen RN  [facility] [place]  Emergency Dept Lab Result RN  # [phone]     Copy of Lab result   Contains abnormal data Ear Culture Aerobic Bacterial  Order: [id number]  Status:  Final result   Visible to patient:  Yes (MyChart) Dx:  Chronic diffuse otitis externa of lef...  Specimen Information: Left " ear        Component 5d ago   Specimen Description Left Ear    Special Requests Specimen collected in eSwab transport (white cap)    Culture Micro Abnormal   Moderate growth   Methicillin resistant Staphylococcus aureus (MRSA)     Culture Micro Abnormal   Moderate growth   Streptococcus agalactiae sero group B   This organism is susceptible to ampicillin, penicillin, vancomycin and the cephalosporins.    If treatment is required AND your patient is allergic to penicillin, contact the   Microbiology Lab within 5 days to request susceptibility testing.     Culture Micro Abnormal   Light growth   Strain 2   Methicillin resistant Staphylococcus aureus (MRSA)     Culture Micro Abnormal   Moderate growth   Staphylococcus capitis   Susceptibility testing not routinely done     Resulting Agency [facility]   Susceptibility     Methicillin resistant staphylococcus aureus (mrsa) (1) Methicillin resistant staphylococcus aureus (mrsa) (3)     MICHELLE MICHELLE     CLINDAMYCIN <=0.25 ug/mL Sensitive1 <=0.25 ug/mL Sensitive1     ERYTHROMYCIN >=8 ug/mL Resistant >=8 ug/mL Resistant     GENTAMICIN <=0.5 ug/mL Sensitive <=0.5 ug/mL Sensitive     LINEZOLID 2 ug/mL Sensitive 2 ug/mL Sensitive     OXACILLIN >=4 ug/mL Resistant >=4 ug/mL Resistant     TETRACYCLINE <=1 ug/mL Sensitive <=1 ug/mL Sensitive     Trimethoprim/Sulfa <=0.5/9.5 u... Sensitive <=0.5/9.5 u... Sensitive     VANCOMYCIN 1 ug/mL Sensitive 1 ug/mL Sensitive             1 This isolate DOES NOT demonstrate inducible clindamycin resistance in vitro.   Clindamycin is susceptible and could be used when indicated, however,   erythromycin is resistant and should not be used.            Specimen Collected: 05/26/21  9:27 AM Last Resulted: 05/29/21  8:38 PM

## 2021-06-01 ENCOUNTER — NURSE TRIAGE (OUTPATIENT)
Dept: NURSING | Facility: CLINIC | Age: 51
End: 2021-06-01

## 2021-06-01 DIAGNOSIS — H60.62 CHRONIC OTITIS EXTERNA OF LEFT EAR, UNSPECIFIED TYPE: Primary | ICD-10-CM

## 2021-06-01 RX ORDER — CLINDAMYCIN PHOSPHATE 11.9 MG/ML
SOLUTION TOPICAL 2 TIMES DAILY
Qty: 30 ML | Refills: 0 | Status: SHIPPED | OUTPATIENT
Start: 2021-06-01 | End: 2021-06-11

## 2021-06-01 RX ORDER — CIPROFLOXACIN AND DEXAMETHASONE 3; 1 MG/ML; MG/ML
4 SUSPENSION/ DROPS AURICULAR (OTIC) 2 TIMES DAILY
Qty: 7.5 ML | Refills: 0 | Status: SHIPPED | OUTPATIENT
Start: 2021-06-01 | End: 2021-06-11

## 2021-06-01 NOTE — TELEPHONE ENCOUNTER
Covering:  Culture results and prior ENT notes from 2019 reviewed when she was treated for a similar problem.  Appears that Ciprodex for 10 days course was effective at that time, so I sent an prescription to [facility].  Follow-up if not improving.    Toni Woo MD

## 2021-06-01 NOTE — TELEPHONE ENCOUNTER
"Pt called states she used ciprofloxacin-dexamethasone (CIPRODEX) 0.3-0.1 % otic suspension in the past and not working for her. Pt state she had 3 empty bottles in the past. Pt states  \"When I got home, I looked at what I was prescribed in the past that didn't work and what you prescribed me did not work in the past. I have three old bottles I saved so I know what doesn't work.\"       Please advise.      Sunny Garcia Nurse Advisor 6/1/2021 5:05 PM      "

## 2021-06-24 LAB
BACTERIA SPEC CULT: NORMAL
Lab: NORMAL
SPECIMEN SOURCE: NORMAL

## 2021-06-24 NOTE — RESULT ENCOUNTER NOTE
Final Fungus culture report is NEGATIVE  No treatment or change in treatment per [facility] ED Lab Result Culture protocol.

## 2021-09-12 ENCOUNTER — HEALTH MAINTENANCE LETTER (OUTPATIENT)
Age: 51
End: 2021-09-12

## 2021-12-31 DIAGNOSIS — I10 BENIGN ESSENTIAL HYPERTENSION: ICD-10-CM

## 2022-01-03 RX ORDER — LISINOPRIL 10 MG/1
10 TABLET ORAL DAILY
Qty: 30 TABLET | Refills: 0 | Status: SHIPPED | OUTPATIENT
Start: 2022-01-03 | End: 2022-03-14

## 2022-01-03 NOTE — TELEPHONE ENCOUNTER
30 day refill sent. Please ask patient to get scheduled and establish with a PCP prior to needing next refill.

## 2022-02-27 ENCOUNTER — HEALTH MAINTENANCE LETTER (OUTPATIENT)
Age: 52
End: 2022-02-27

## 2022-03-14 ENCOUNTER — OFFICE VISIT (OUTPATIENT)
Dept: FAMILY MEDICINE | Facility: CLINIC | Age: 52
End: 2022-03-14
Payer: COMMERCIAL

## 2022-03-14 VITALS
TEMPERATURE: 99.8 F | SYSTOLIC BLOOD PRESSURE: 132 MMHG | RESPIRATION RATE: 14 BRPM | HEART RATE: 96 BPM | OXYGEN SATURATION: 95 % | BODY MASS INDEX: 36.34 KG/M2 | DIASTOLIC BLOOD PRESSURE: 88 MMHG | WEIGHT: 232 LBS

## 2022-03-14 DIAGNOSIS — I10 BENIGN ESSENTIAL HYPERTENSION: Primary | ICD-10-CM

## 2022-03-14 DIAGNOSIS — L30.9 ECZEMA, UNSPECIFIED TYPE: ICD-10-CM

## 2022-03-14 DIAGNOSIS — J30.2 SEASONAL ALLERGIC RHINITIS, UNSPECIFIED TRIGGER: ICD-10-CM

## 2022-03-14 DIAGNOSIS — Z12.31 VISIT FOR SCREENING MAMMOGRAM: ICD-10-CM

## 2022-03-14 DIAGNOSIS — E03.9 ACQUIRED HYPOTHYROIDISM: ICD-10-CM

## 2022-03-14 DIAGNOSIS — L21.9 DERMATITIS, SEBORRHEIC: ICD-10-CM

## 2022-03-14 DIAGNOSIS — L40.9 PSORIASIS: ICD-10-CM

## 2022-03-14 LAB
ANION GAP SERPL CALCULATED.3IONS-SCNC: 5 MMOL/L (ref 3–14)
BUN SERPL-MCNC: 18 MG/DL (ref 7–30)
CALCIUM SERPL-MCNC: 8.7 MG/DL (ref 8.5–10.1)
CHLORIDE BLD-SCNC: 107 MMOL/L (ref 94–109)
CO2 SERPL-SCNC: 25 MMOL/L (ref 20–32)
CREAT SERPL-MCNC: 0.7 MG/DL (ref 0.52–1.04)
GFR SERPL CREATININE-BSD FRML MDRD: >90 ML/MIN/1.73M2
GLUCOSE BLD-MCNC: 93 MG/DL (ref 70–99)
POTASSIUM BLD-SCNC: 4.2 MMOL/L (ref 3.4–5.3)
SODIUM SERPL-SCNC: 137 MMOL/L (ref 133–144)
TSH SERPL DL<=0.005 MIU/L-ACNC: 2.42 MU/L (ref 0.4–4)

## 2022-03-14 PROCEDURE — 36415 COLL VENOUS BLD VENIPUNCTURE: CPT | Performed by: NURSE PRACTITIONER

## 2022-03-14 PROCEDURE — 99214 OFFICE O/P EST MOD 30 MIN: CPT | Performed by: NURSE PRACTITIONER

## 2022-03-14 PROCEDURE — 80048 BASIC METABOLIC PNL TOTAL CA: CPT | Performed by: NURSE PRACTITIONER

## 2022-03-14 PROCEDURE — 84443 ASSAY THYROID STIM HORMONE: CPT | Performed by: NURSE PRACTITIONER

## 2022-03-14 RX ORDER — KETOCONAZOLE 20 MG/ML
SHAMPOO TOPICAL
Qty: 120 ML | Refills: 3 | Status: SHIPPED | OUTPATIENT
Start: 2022-03-14 | End: 2024-07-02

## 2022-03-14 RX ORDER — FLUTICASONE PROPIONATE 50 MCG
2 SPRAY, SUSPENSION (ML) NASAL DAILY
Qty: 11 G | Refills: 3 | Status: SHIPPED | OUTPATIENT
Start: 2022-03-14 | End: 2023-04-17

## 2022-03-14 RX ORDER — TRIAMCINOLONE ACETONIDE 1 MG/G
CREAM TOPICAL 2 TIMES DAILY
Qty: 30 G | Refills: 1 | Status: SHIPPED | OUTPATIENT
Start: 2022-03-14 | End: 2024-07-02

## 2022-03-14 RX ORDER — LEVOCETIRIZINE DIHYDROCHLORIDE 5 MG/1
5 TABLET, FILM COATED ORAL EVERY EVENING
Qty: 90 TABLET | Refills: 3 | Status: SHIPPED | OUTPATIENT
Start: 2022-03-14 | End: 2023-02-23

## 2022-03-14 RX ORDER — LISINOPRIL 10 MG/1
10 TABLET ORAL DAILY
Qty: 90 TABLET | Refills: 0 | Status: SHIPPED | OUTPATIENT
Start: 2022-03-14 | End: 2022-06-14

## 2022-03-14 RX ORDER — CLOBETASOL PROPIONATE 0.5 MG/G
CREAM TOPICAL 2 TIMES DAILY
Qty: 60 G | Refills: 1 | Status: SHIPPED | OUTPATIENT
Start: 2022-03-14 | End: 2023-04-17

## 2022-03-14 ASSESSMENT — PAIN SCALES - GENERAL: PAINLEVEL: MODERATE PAIN (5)

## 2022-03-14 NOTE — PROGRESS NOTES
Assessment & Plan     Benign essential hypertension  Blood pressure high end of normal initially was hypertensive greater than 140/90.  Discussed the patient's lisinopril dosing may need to increase to 20 mg from 10.  She will monitor her blood pressure from home and keep me updated.  - lisinopril (ZESTRIL) 10 MG tablet; Take 1 tablet (10 mg) by mouth daily  - Basic metabolic panel  (Ca, Cl, CO2, Creat, Gluc, K, Na, BUN); Future  - Basic metabolic panel  (Ca, Cl, CO2, Creat, Gluc, K, Na, BUN)    Dermatitis, seborrheic  Seborrheic dermatitis present on scalp versus psoriasis.  Refilled ketoconazole shampoo for patient.  - ketoconazole (NIZORAL) 2 % external shampoo; APPLY TO AFFECTED AREA AND WASH OFF AFTER 5 MINUTES. USE 2-3 TIMES PER WEEK.    Eczema, unspecified type  Acute eczema on arms as well as scalp versus psoriasis.  - triamcinolone (KENALOG) 0.1 % external cream; Apply topically 2 times daily For up to 2 weeks, then take one week off and repeat as needed    Visit for screening mammogram  Order placed today  - *MA Screening Digital Bilateral; Future    Psoriasis  Psoriasis type rash lesion on scalp versus seborrheic dermatitis.  Clobetasol cream prescribed to try and improve rash.  Patient advised to follow-up in 2 to 4 weeks if symptoms are not improving.  - clobetasol (TEMOVATE) 0.05 % external cream; Apply topically 2 times daily To scalp and affected areas until clear. No longer than 4 weeks. Then can use as needed for maintenance.    Seasonal allergic rhinitis, unspecified trigger  Right tympanic membrane is bulging without acute infection.  Recommend trying xylitol and Flonase to reduce eustachian tube dysfunction secondary to seasonal versus year-round allergic rhinitis.  - levocetirizine (XYZAL) 5 MG tablet; Take 1 tablet (5 mg) by mouth every evening  - fluticasone (FLONASE) 50 MCG/ACT nasal spray; Spray 2 sprays into both nostrils daily    Acquired hypothyroidism  Has not been taking her  "levothyroxine.  We will check a TSH today.  - TSH with free T4 reflex; Future  - TSH with free T4 reflex             BMI:   Estimated body mass index is 36.34 kg/m  as calculated from the following:    Height as of 5/26/21: 1.702 m (5' 7\").    Weight as of this encounter: 105.2 kg (232 lb).           Return in about 4 weeks (around 4/11/2022) for Routine preventive, ongoing symptoms if not improving.    LUPE Solis CNP  [facility]    Antonio Saldaña is a 51 year old who presents for the following health issues:    History of Present Illness       Hypertension: She presents for follow up of hypertension.  She does not check blood pressure  regularly outside of the clinic. Outside blood pressures have been over 140/90. She does not follow a low salt diet.     Reason for visit:  Ear infection and blood pressure check and refill.  Symptom onset:  1-3 days ago  Symptoms include:  My ears hurt and burn. I have a scratchy throat.  Symptom intensity:  Moderate  Symptom progression:  Staying the same  Had these symptoms before:  Yes  Has tried/received treatment for these symptoms:  Yes  Previous treatment was successful:  No  What makes it worse:  No  What makes it better:  Not really    She eats 2-3 servings of fruits and vegetables daily.She consumes 2 sweetened beverage(s) daily.She exercises with enough effort to increase her heart rate 9 or less minutes per day.  She exercises with enough effort to increase her heart rate 3 or less days per week.   She is taking medications regularly.     Has had ongoing ear pain for years.  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  She reports that the itching has been very bothersome the last several days.  She reports that the symptoms wax and wane. She has year round allergies in which she takes loratadine. She has not used nasal steroids in the past.  Her right ear is not bothersome but " "will feel \"full\" sometimes.  She has a rash that is present around her ears and on her scalp in her hairline.  She has used ketoconazole shampoo in the past for this and has utilize triamcinolone cream symptoms have not fully cleared.  She reports that at times it seems to be flaky and will \"smell\".  She has been diagnosed with seborrheic dermatitis.  She has not been diagnosed with psoriasis.    She is not taking her levothyroxine.  She would like to have her TSH rechecked today.  She is also due for a mammogram and is open to having this referral placed.      Review of Systems   Constitutional, HEENT, cardiovascular, pulmonary, gi and gu systems are negative, except as otherwise noted.      Objective    /88   Pulse 96   Temp 99.8  F (37.7  C) (Tympanic)   Resp 14   Wt 105.2 kg (232 lb)   LMP 01/23/2022 (Exact Date)   SpO2 95%   Breastfeeding No   BMI 36.34 kg/m    Body mass index is 36.34 kg/m .  Physical Exam   GENERAL: healthy, alert and no distress  NECK: no adenopathy, no asymmetry, masses, or scars and thyroid normal to palpation  EARS: Right TM bulging and slightly opaque.  Canal clear.  Left TM within normal limits canals clear.  RESP: lungs clear to auscultation - no rales, rhonchi or wheezes  CV: regular rate and rhythm, normal S1 S2, no S3 or S4, no murmur, click or rub, no peripheral edema and peripheral pulses strong  ABDOMEN: soft, nontender, no hepatosplenomegaly, no masses and bowel sounds normal  MS: no gross musculoskeletal defects noted, no edema  SKIN: Rash on scalp worse around ears consistent with seborrheic dermatitis however could also have aspects of psoriasis present.  Flaking rash with healing lesions present.  Mild erythremia.    "

## 2022-03-24 ENCOUNTER — HOSPITAL ENCOUNTER (OUTPATIENT)
Dept: MAMMOGRAPHY | Facility: CLINIC | Age: 52
Discharge: HOME | End: 2022-03-24
Attending: NURSE PRACTITIONER | Admitting: NURSE PRACTITIONER
Payer: COMMERCIAL

## 2022-03-24 DIAGNOSIS — Z12.31 VISIT FOR SCREENING MAMMOGRAM: ICD-10-CM

## 2022-03-24 PROCEDURE — 77067 SCR MAMMO BI INCL CAD: CPT

## 2022-05-31 DIAGNOSIS — I10 BENIGN ESSENTIAL HYPERTENSION: ICD-10-CM

## 2022-06-14 RX ORDER — LISINOPRIL 10 MG/1
10 TABLET ORAL DAILY
Qty: 90 TABLET | Refills: 0 | Status: SHIPPED | OUTPATIENT
Start: 2022-06-14 | End: 2022-06-23

## 2022-06-14 NOTE — TELEPHONE ENCOUNTER
Routing refill request to provider for review/approval because:  BP follow up is needed.    Pt was last seen for BP 3/14/22.  BP was borderline.  Pt was to collect home readings and report to provider.    I spoke with pt.  She does not have a home monitor and has not been checking home BP's.  Pt will come in for RN BP check tomorrow.    Chrissy Porter RN

## 2022-06-15 ENCOUNTER — TELEPHONE (OUTPATIENT)
Dept: FAMILY MEDICINE | Facility: CLINIC | Age: 52
End: 2022-06-15

## 2022-06-15 ENCOUNTER — ALLIED HEALTH/NURSE VISIT (OUTPATIENT)
Dept: FAMILY MEDICINE | Facility: CLINIC | Age: 52
End: 2022-06-15
Payer: COMMERCIAL

## 2022-06-15 VITALS — HEART RATE: 92 BPM | SYSTOLIC BLOOD PRESSURE: 145 MMHG | DIASTOLIC BLOOD PRESSURE: 86 MMHG

## 2022-06-15 DIAGNOSIS — I10 BENIGN ESSENTIAL HYPERTENSION: Primary | ICD-10-CM

## 2022-06-15 PROCEDURE — 99207 PR NO CHARGE NURSE ONLY: CPT

## 2022-06-15 NOTE — PROGRESS NOTES
Piper Unger is a 52 year old patient who comes in today for a Blood Pressure check because of ongoing blood pressure monitoring.  Vital Signs as repeated by /85,145/86, 144/88, p-89  Patient is taking medication as prescribed  Patient is tolerating medications well.  Patient is not monitoring Blood Pressure at home.  Average readings if yes are NA  Current complaints: none  Disposition:  Pt will get a BP monitor for at home and check and record daily blood pressures.  She will increase her physical activity and avoid high sodium foods.

## 2022-06-15 NOTE — TELEPHONE ENCOUNTER
Piper Unger is a 52 year old patient who comes in today for a Blood Pressure check because of ongoing blood pressure monitoring.  Vital Signs as repeated by /85,145/86, 144/88, p-89  Patient is taking medication as prescribed  Patient is tolerating medications well.  Patient is not monitoring Blood Pressure at home.  Average readings if yes are NA  Current complaints: none  Disposition:  Pt will get a BP monitor for at home and check and record daily blood pressures.  She will increase her physical activity and avoid high sodium foods.    Pt was reminded she needs to establish care with a new provider.  I will send this to the provider of the day.

## 2022-06-15 NOTE — TELEPHONE ENCOUNTER
All those BPs are too high.  Please schedule a clinic appt to establish with new clinician who might likely increase the lisinopril.  Thank you,  Johan Mantilla MD

## 2022-06-23 ENCOUNTER — OFFICE VISIT (OUTPATIENT)
Dept: FAMILY MEDICINE | Facility: CLINIC | Age: 52
End: 2022-06-23
Payer: COMMERCIAL

## 2022-06-23 VITALS
OXYGEN SATURATION: 96 % | HEIGHT: 67 IN | RESPIRATION RATE: 18 BRPM | BODY MASS INDEX: 36.88 KG/M2 | DIASTOLIC BLOOD PRESSURE: 94 MMHG | HEART RATE: 86 BPM | WEIGHT: 235 LBS | TEMPERATURE: 97.1 F | SYSTOLIC BLOOD PRESSURE: 146 MMHG

## 2022-06-23 DIAGNOSIS — I10 BENIGN ESSENTIAL HYPERTENSION: Primary | ICD-10-CM

## 2022-06-23 PROCEDURE — 99213 OFFICE O/P EST LOW 20 MIN: CPT | Performed by: NURSE PRACTITIONER

## 2022-06-23 RX ORDER — LISINOPRIL 20 MG/1
20 TABLET ORAL DAILY
Qty: 90 TABLET | Refills: 1 | Status: SHIPPED | OUTPATIENT
Start: 2022-06-23 | End: 2022-11-30

## 2022-06-23 NOTE — PROGRESS NOTES
"  Assessment & Plan     Benign essential hypertension  Not controlled. Discussed lifestyle modification. Normal BMP in March. Increase lisinopril to 20mg daily. Send me a message 2 weeks with BPs.  - lisinopril (ZESTRIL) 20 MG tablet; Take 1 tablet (20 mg) by mouth daily    6}     BMI:   Estimated body mass index is 36.81 kg/m  as calculated from the following:    Height as of this encounter: 1.702 m (5' 7\").    Weight as of this encounter: 106.6 kg (235 lb).       Patient Instructions   Increase your lisinopril to 20mg daily.  Send me a message in 2 weeks with daily blood pressures.    Lifestyle changes that can lower blood pressure include:  Limiting sodium in the diet.  Goal of <2.3 grams (2300 mg) daily.  Avoiding salty and prepared foods and not adding salt at the table can help.   Regular moderate exercise at least 150 minutes per week (30 minutes per day 5 days per week) plus muscle strengthening exercise at least 2 days per week.   Weight loss can help even if not dramatic or down to normal BMI range.  DASH type diet can actually lower blood pressure. You can find multiple resources for this on the internet.  Cutting back on alcohol can help for women drinking more than a drink per day and men more than 2 drinks per day on average.   Quitting smoking can help reduce your risk of heart attack or stroke.    A good resource for lifestyle changes is https://www.cardiosmart.org/topics/healthy-living        Return in about 2 weeks (around 7/7/2022) for BP Recheck.    LUPE Barraza CNP  [facility]    Antonio Saldaña is a 52 year old, presenting for the following health issues:  Hypertension    Pt came in for another appt and was told to increase BP meds due to high reading (145/83).     History of Present Illness       Hypertension: She presents for follow up of hypertension.  She does not check blood pressure  regularly outside of the clinic. Outside blood pressures have been " "over 140/90. She does not follow a low salt diet.       Above HPI reviewed. Additionally, increased stress recently. Has had 15lb weight gain over the past 2 years. Has not been watching sodium intake. No chest pain, shortness of breath, dyspnea on exertion, lower extremity swelling.          Review of Systems   Constitutional, HEENT, cardiovascular, pulmonary, gi and gu systems are negative, except as otherwise noted.      Objective    BP (!) 146/94   Pulse 86   Temp 97.1  F (36.2  C) (Tympanic)   Resp 18   Ht 1.702 m (5' 7\")   Wt 106.6 kg (235 lb)   SpO2 96%   BMI 36.81 kg/m    Body mass index is 36.81 kg/m .  Physical Exam  Vitals and nursing note reviewed.   Constitutional:       General: She is not in acute distress.     Appearance: Normal appearance.   HENT:      Head: Normocephalic and atraumatic.      Mouth/Throat:      Mouth: Mucous membranes are moist.   Cardiovascular:      Rate and Rhythm: Normal rate.   Pulmonary:      Effort: Pulmonary effort is normal.   Musculoskeletal:      Cervical back: Neck supple.   Skin:     General: Skin is warm and dry.   Neurological:      General: No focal deficit present.      Mental Status: She is alert.   Psychiatric:         Mood and Affect: Mood normal.         Behavior: Behavior normal.                          .  ..  "

## 2022-06-23 NOTE — PATIENT INSTRUCTIONS
Increase your lisinopril to 20mg daily.  Send me a message in 2 weeks with daily blood pressures.    Lifestyle changes that can lower blood pressure include:  Limiting sodium in the diet.  Goal of <2.3 grams (2300 mg) daily.  Avoiding salty and prepared foods and not adding salt at the table can help.   Regular moderate exercise at least 150 minutes per week (30 minutes per day 5 days per week) plus muscle strengthening exercise at least 2 days per week.   Weight loss can help even if not dramatic or down to normal BMI range.  DASH type diet can actually lower blood pressure. You can find multiple resources for this on the internet.  Cutting back on alcohol can help for women drinking more than a drink per day and men more than 2 drinks per day on average.   Quitting smoking can help reduce your risk of heart attack or stroke.    A good resource for lifestyle changes is https://www.cardiosmart.org/topics/healthy-living

## 2022-09-15 ENCOUNTER — TELEPHONE (OUTPATIENT)
Dept: FAMILY MEDICINE | Facility: CLINIC | Age: 52
End: 2022-09-15

## 2022-09-15 NOTE — TELEPHONE ENCOUNTER
Patient Quality Outreach    Patient is due for the following:   Cervical Cancer Screening - PAP Needed  Physical Preventive Adult Physical    Next Steps:   Schedule a Adult Preventative    Type of outreach:    Sent [employer] message.      Questions for provider review:    None     TASH Grayson LPN

## 2022-09-27 ENCOUNTER — TELEPHONE (OUTPATIENT)
Dept: FAMILY MEDICINE | Facility: CLINIC | Age: 52
End: 2022-09-27

## 2022-09-27 NOTE — TELEPHONE ENCOUNTER
Patient Quality Outreach    Patient is due for the following:   Cervical Cancer Screening - PAP Needed  Physical Preventive Adult Physical      Topic Date Due     COVID-19 Vaccine (1) Never done     Zoster (Shingles) Vaccine (1 of 2) Never done     Flu Vaccine (1) Never done       Next Steps:   Schedule a Adult Preventative    Type of outreach:    Sent [employer] message.    Next Steps:  Reach out within 90 days via [employer].    Max number of attempts reached: No. Will try again in 90 days if patient still on fail list.    Questions for provider review:    None     Kathleen Cha CMA

## 2022-11-19 ENCOUNTER — HEALTH MAINTENANCE LETTER (OUTPATIENT)
Age: 52
End: 2022-11-19

## 2023-01-24 ENCOUNTER — TELEPHONE (OUTPATIENT)
Dept: FAMILY MEDICINE | Facility: CLINIC | Age: 53
End: 2023-01-24
Payer: COMMERCIAL

## 2023-01-24 NOTE — TELEPHONE ENCOUNTER
Patient Quality Outreach    Patient is due for the following:   Cervical Cancer Screening - PAP Needed  Physical Preventive Adult Physical      Topic Date Due     Hepatitis B Vaccine (1 of 3 - 3-dose series) Never done     COVID-19 Vaccine (1) Never done     Zoster (Shingles) Vaccine (1 of 2) Never done     Flu Vaccine (1) Never done       Next Steps:   Schedule a Adult Preventative    Type of outreach:    Sent [employer] message.    Next Steps:  Reach out within 90 days via [employer].    Max number of attempts reached: No. Will try again in 90 days if patient still on fail list.    Questions for provider review:    None     Ida Higgins MA

## 2023-02-23 DIAGNOSIS — J30.2 SEASONAL ALLERGIC RHINITIS, UNSPECIFIED TRIGGER: ICD-10-CM

## 2023-02-23 RX ORDER — LEVOCETIRIZINE DIHYDROCHLORIDE 5 MG/1
5 TABLET, FILM COATED ORAL EVERY EVENING
Qty: 90 TABLET | Refills: 0 | Status: SHIPPED | OUTPATIENT
Start: 2023-02-23 | End: 2023-05-19

## 2023-02-27 ENCOUNTER — TELEPHONE (OUTPATIENT)
Dept: FAMILY MEDICINE | Facility: CLINIC | Age: 53
End: 2023-02-27
Payer: COMMERCIAL

## 2023-02-27 DIAGNOSIS — I10 BENIGN ESSENTIAL HYPERTENSION: ICD-10-CM

## 2023-02-27 RX ORDER — LISINOPRIL 20 MG/1
20 TABLET ORAL DAILY
Qty: 90 TABLET | Refills: 0 | Status: SHIPPED | OUTPATIENT
Start: 2023-02-27 | End: 2023-04-17

## 2023-02-27 NOTE — LETTER
[facility]  [address]  [facility] [id number] [phone]  February 27, 2023    Piper Unger  [address] [facility] [address]    Dear Piper,    We care about your health and have reviewed your health plan including your medical conditions, medication list, and lab results.  Based on this review, it is recommended that you follow up regarding the following health topic(s):     -Wellness (Physical) Visit - Appt needed for medication refills     Please call us at [phone] (or use [employer]) to schedule appt to address the above recommendations.     Thank you for trusting [facility] and we appreciate the opportunity to serve you.  We look forward to supporting your healthcare needs in the future.    Healthy Regards,      Your Health Care Team  [facility]

## 2023-02-27 NOTE — TELEPHONE ENCOUNTER
"Requested Prescriptions   Pending Prescriptions Disp Refills     lisinopril (ZESTRIL) 20 MG tablet 90 tablet 0     Sig: Take 1 tablet (20 mg) by mouth daily       ACE Inhibitors (Including Combos) Protocol Failed - 2/27/2023  8:42 AM        Failed - Blood pressure under 140/90 in past 12 months     BP Readings from Last 3 Encounters:   06/23/22 (!) 146/94   06/15/22 (!) 145/86   03/14/22 132/88                 Passed - Recent (12 mo) or future (30 days) visit within the authorizing provider's specialty     Patient has had an office visit with the authorizing provider or a provider within the authorizing providers department within the previous 12 mos or has a future within next 30 days. See \"Patient Info\" tab in inbasket, or \"Choose Columns\" in Meds & Orders section of the refill encounter.              Passed - Medication is active on med list        Passed - Patient is age 18 or older        Passed - No active pregnancy on record        Passed - Normal serum creatinine on file in past 12 months     Recent Labs   Lab Test 03/14/22  1332   CR 0.70       Ok to refill medication if creatinine is low          Passed - Normal serum potassium on file in past 12 months     Recent Labs   Lab Test 03/14/22  1332   POTASSIUM 4.2             Passed - No positive pregnancy test within past 12 months             "

## 2023-04-09 ENCOUNTER — HEALTH MAINTENANCE LETTER (OUTPATIENT)
Age: 53
End: 2023-04-09

## 2023-04-17 ENCOUNTER — OFFICE VISIT (OUTPATIENT)
Dept: FAMILY MEDICINE | Facility: CLINIC | Age: 53
End: 2023-04-17
Payer: COMMERCIAL

## 2023-04-17 ENCOUNTER — HOSPITAL ENCOUNTER (OUTPATIENT)
Dept: MAMMOGRAPHY | Facility: CLINIC | Age: 53
Discharge: HOME | End: 2023-04-17
Admitting: NURSE PRACTITIONER
Payer: COMMERCIAL

## 2023-04-17 VITALS
BODY MASS INDEX: 37.54 KG/M2 | HEART RATE: 108 BPM | TEMPERATURE: 98.8 F | OXYGEN SATURATION: 96 % | SYSTOLIC BLOOD PRESSURE: 130 MMHG | RESPIRATION RATE: 18 BRPM | DIASTOLIC BLOOD PRESSURE: 86 MMHG | WEIGHT: 233.6 LBS | HEIGHT: 66 IN

## 2023-04-17 DIAGNOSIS — I10 BENIGN ESSENTIAL HYPERTENSION: ICD-10-CM

## 2023-04-17 DIAGNOSIS — Z00.00 ROUTINE GENERAL MEDICAL EXAMINATION AT A HEALTH CARE FACILITY: Primary | ICD-10-CM

## 2023-04-17 DIAGNOSIS — Z12.4 CERVICAL CANCER SCREENING: ICD-10-CM

## 2023-04-17 DIAGNOSIS — L40.9 PSORIASIS: ICD-10-CM

## 2023-04-17 DIAGNOSIS — Z12.31 VISIT FOR SCREENING MAMMOGRAM: ICD-10-CM

## 2023-04-17 DIAGNOSIS — E03.9 HYPOTHYROIDISM, UNSPECIFIED TYPE: ICD-10-CM

## 2023-04-17 DIAGNOSIS — M19.90 ARTHRITIS: ICD-10-CM

## 2023-04-17 DIAGNOSIS — K92.89 GAS BLOAT SYNDROME: ICD-10-CM

## 2023-04-17 DIAGNOSIS — E66.01 MORBID OBESITY (H): ICD-10-CM

## 2023-04-17 LAB
ANION GAP SERPL CALCULATED.3IONS-SCNC: 14 MMOL/L (ref 7–15)
BUN SERPL-MCNC: 14 MG/DL (ref 6–20)
CALCIUM SERPL-MCNC: 9.4 MG/DL (ref 8.6–10)
CHLORIDE SERPL-SCNC: 104 MMOL/L (ref 98–107)
CHOLEST SERPL-MCNC: 308 MG/DL
CREAT SERPL-MCNC: 0.84 MG/DL (ref 0.51–0.95)
DEPRECATED HCO3 PLAS-SCNC: 24 MMOL/L (ref 22–29)
GFR SERPL CREATININE-BSD FRML MDRD: 82 ML/MIN/1.73M2
GLUCOSE SERPL-MCNC: 104 MG/DL (ref 70–99)
HDLC SERPL-MCNC: 42 MG/DL
LDLC SERPL CALC-MCNC: ABNORMAL MG/DL
LDLC SERPL DIRECT ASSAY-MCNC: 192 MG/DL
NONHDLC SERPL-MCNC: 266 MG/DL
POTASSIUM SERPL-SCNC: 4.1 MMOL/L (ref 3.4–5.3)
SODIUM SERPL-SCNC: 142 MMOL/L (ref 136–145)
TRIGL SERPL-MCNC: 444 MG/DL
TSH SERPL DL<=0.005 MIU/L-ACNC: 2.48 UIU/ML (ref 0.3–4.2)

## 2023-04-17 PROCEDURE — 99214 OFFICE O/P EST MOD 30 MIN: CPT | Mod: 25 | Performed by: NURSE PRACTITIONER

## 2023-04-17 PROCEDURE — 36415 COLL VENOUS BLD VENIPUNCTURE: CPT | Performed by: NURSE PRACTITIONER

## 2023-04-17 PROCEDURE — G0145 SCR C/V CYTO,THINLAYER,RESCR: HCPCS | Performed by: NURSE PRACTITIONER

## 2023-04-17 PROCEDURE — 87624 HPV HI-RISK TYP POOLED RSLT: CPT | Performed by: NURSE PRACTITIONER

## 2023-04-17 PROCEDURE — 99396 PREV VISIT EST AGE 40-64: CPT | Performed by: NURSE PRACTITIONER

## 2023-04-17 PROCEDURE — 80048 BASIC METABOLIC PNL TOTAL CA: CPT | Performed by: NURSE PRACTITIONER

## 2023-04-17 PROCEDURE — 83721 ASSAY OF BLOOD LIPOPROTEIN: CPT | Mod: 59 | Performed by: NURSE PRACTITIONER

## 2023-04-17 PROCEDURE — 84443 ASSAY THYROID STIM HORMONE: CPT | Performed by: NURSE PRACTITIONER

## 2023-04-17 PROCEDURE — 80061 LIPID PANEL: CPT | Performed by: NURSE PRACTITIONER

## 2023-04-17 PROCEDURE — 77067 SCR MAMMO BI INCL CAD: CPT

## 2023-04-17 RX ORDER — CLOBETASOL PROPIONATE 0.5 MG/ML
SOLUTION TOPICAL 2 TIMES DAILY
Qty: 50 ML | Refills: 6 | Status: SHIPPED | OUTPATIENT
Start: 2023-04-17 | End: 2024-07-02

## 2023-04-17 RX ORDER — LISINOPRIL 20 MG/1
20 TABLET ORAL DAILY
Qty: 90 TABLET | Refills: 3 | Status: SHIPPED | OUTPATIENT
Start: 2023-04-17 | End: 2024-04-24

## 2023-04-17 ASSESSMENT — ENCOUNTER SYMPTOMS
HEARTBURN: 1
COUGH: 1
BREAST MASS: 0
FEVER: 0
PALPITATIONS: 0
NERVOUS/ANXIOUS: 0
SORE THROAT: 0
HEMATOCHEZIA: 0
ARTHRALGIAS: 1
WEAKNESS: 0
CONSTIPATION: 0
EYE PAIN: 0
PARESTHESIAS: 0
FREQUENCY: 0
HEADACHES: 1
MYALGIAS: 0
SHORTNESS OF BREATH: 1
JOINT SWELLING: 0
DYSURIA: 0
NAUSEA: 0
DIARRHEA: 1
HEMATURIA: 0
ABDOMINAL PAIN: 0
DIZZINESS: 0
CHILLS: 0

## 2023-04-17 ASSESSMENT — PAIN SCALES - GENERAL: PAINLEVEL: NO PAIN (0)

## 2023-04-17 ASSESSMENT — PATIENT HEALTH QUESTIONNAIRE - PHQ9
10. IF YOU CHECKED OFF ANY PROBLEMS, HOW DIFFICULT HAVE THESE PROBLEMS MADE IT FOR YOU TO DO YOUR WORK, TAKE CARE OF THINGS AT HOME, OR GET ALONG WITH OTHER PEOPLE: NOT DIFFICULT AT ALL
SUM OF ALL RESPONSES TO PHQ QUESTIONS 1-9: 4
SUM OF ALL RESPONSES TO PHQ QUESTIONS 1-9: 4

## 2023-04-17 NOTE — PATIENT INSTRUCTIONS
Use Beano with meals and GasX as needed for gas and bloating.  See handout.  Take calcium  1200 mg daily and Vitamin D [id number] daily for strong bones.  3.   Check with insurance for shingles vaccination and make nursing appointment if you decide to get this completed.  4.  Follow-up in 1 year for preventative exam.    Preventive Health Recommendations  Female Ages 50 - 64    Yearly exam: See your health care provider every year in order to  Review health changes.   Discuss preventive care.    Review your medicines if your doctor has prescribed any.    Get a Pap test every three years (unless you have an abnormal result and your provider advises testing more often).  If you get Pap tests with HPV test, you only need to test every 5 years, unless you have an abnormal result.   You do not need a Pap test if your uterus was removed (hysterectomy) and you have not had cancer.  You should be tested each year for STDs (sexually transmitted diseases) if you're at risk.   Have a mammogram every 1 to 2 years.  Have a colonoscopy at age 50, or have a yearly FIT test (stool test). These exams screen for colon cancer.    Have a cholesterol test every 5 years, or more often if advised.  Have a diabetes test (fasting glucose) every three years. If you are at risk for diabetes, you should have this test more often.   If you are at risk for osteoporosis (brittle bone disease), think about having a bone density scan (DEXA).    Shots: Get a flu shot each year. Get a tetanus shot every 10 years.    Nutrition:   Eat at least 5 servings of fruits and vegetables each day.  Eat whole-grain bread, whole-wheat pasta and brown rice instead of white grains and rice.  Get adequate Calcium and Vitamin D.     Lifestyle  Exercise at least 150 minutes a week (30 minutes a day, 5 days a week). This will help you control your weight and prevent disease.  Limit alcohol to one drink per day.  No smoking.   Wear sunscreen to prevent skin cancer.    See your dentist every six months for an exam and cleaning.  See your eye doctor every 1 to 2 years.

## 2023-04-17 NOTE — PROGRESS NOTES
SUBJECTIVE:   CC: Piper is an 52 year old who presents for preventive health visit.     Patient has been advised of split billing requirements and indicates understanding: Yes     Healthy Habits:     Getting at least 3 servings of Calcium per day:  NO    Bi-annual eye exam:  Yes    Dental care twice a year:  Yes    Sleep apnea or symptoms of sleep apnea:  Sleep apnea    Diet:  Regular (no restrictions)    Frequency of exercise:  None    Taking medications regularly:  Yes    Medication side effects:  Not applicable    PHQ-2 Total Score: 0    Additional concerns today:  No    Drinks milk but does not feel it is enough.    Today's PHQ-2 Score:       4/17/2023    10:18 AM   PHQ-2 ( 1999 [employer])   Q1: Little interest or pleasure in doing things 0   Q2: Feeling down, depressed or hopeless 0   PHQ-2 Score 0   Q1: Little interest or pleasure in doing things Not at all    Not at all   Q2: Feeling down, depressed or hopeless Not at all    Not at all   PHQ-2 Score 0    0     Have you ever done Advance Care Planning? (For example, a Health Directive, POLST, or a discussion with a medical provider or your loved ones about your wishes): No, advance care planning information given to patient to review.  Patient plans to discuss their wishes with loved ones or provider.      Social History     Tobacco Use     Smoking status: Never     Smokeless tobacco: Never   Vaping Use     Vaping status: Never Used   Substance Use Topics     Alcohol use: Yes     Comment: occasional           4/17/2023    10:18 AM   Alcohol Use   Prescreen: >3 drinks/day or >7 drinks/week? No     Reviewed orders with patient.  Reviewed health maintenance and updated orders accordingly - Yes  Lab work is in process  Labs reviewed in EPIC  BP Readings from Last 3 Encounters:   04/17/23 130/86   06/23/22 (!) 146/94   06/15/22 (!) 145/86    Wt Readings from Last 3 Encounters:   04/17/23 106 kg (233 lb 9.6 oz)   06/23/22 106.6 kg (235 lb)   03/14/22 105.2 kg (232  lb)                  Patient Active Problem List   Diagnosis     Allergic rhinitis due to other allergen     Hypothyroidism     Excessive sweating     HYPERLIPIDEMIA LDL GOAL <130     Benign essential hypertension     Obesity (BMI 35.0-39.9) with comorbidity (H)     Eczema, unspecified type     Psoriasis     Past Surgical History:   Procedure Laterality Date     COLONOSCOPY N/A 3/15/2018    Procedure: COLONOSCOPY;  colonoscopy;  Surgeon: Shahzad Villafana MD;  Location: [facility]     NO HISTORY OF SURGERY         Social History     Tobacco Use     Smoking status: Never     Smokeless tobacco: Never   Vaping Use     Vaping status: Never Used   Substance Use Topics     Alcohol use: Yes     Comment: occasional     Family History   Problem Relation Age of Onset     Hypertension Father      Skin Cancer Father      Atrial fibrillation Father         intermittent     Hypertension Sister      Hyperlipidemia Sister      Sleep Apnea Sister      Hypertension Brother      Sleep Apnea Brother      Glaucoma Maternal Grandmother      Skin Cancer Maternal Grandfather      Hypertension Paternal Grandmother      Cerebrovascular Disease Paternal Grandmother      Lactose Intolerance Daughter      Crohn's Disease Son      Allergies Son         eczema         Current Outpatient Medications   Medication Sig Dispense Refill     ketoconazole (NIZORAL) 2 % external shampoo APPLY TO AFFECTED AREA AND WASH OFF AFTER 5 MINUTES. USE 2-3 TIMES PER WEEK. 120 mL 3     levocetirizine (XYZAL) 5 MG tablet Take 1 tablet (5 mg) by mouth every evening 90 tablet 0     lisinopril (ZESTRIL) 20 MG tablet Take 1 tablet (20 mg) by mouth daily 90 tablet 0     triamcinolone (KENALOG) 0.1 % external cream Apply topically 2 times daily For up to 2 weeks, then take one week off and repeat as needed 30 g 1     Allergies   Allergen Reactions     Dust Mites      Pollen Extract        Breast Cancer Screening:  Any new diagnosis of family breast, ovarian, or bowel cancer?  No    FHS-7:       3/24/2022     5:15 PM   Breast CA Risk Assessment (FHS-7)   Did any of your first-degree relatives have breast or ovarian cancer? No   Did any of your relatives have bilateral breast cancer? No   Did any man in your family have breast cancer? No   Did any woman in your family have breast and ovarian cancer? No   Did any woman in your family have breast cancer before age 50 y? No   Do you have 2 or more relatives with breast and/or ovarian cancer? No   Do you have 2 or more relatives with breast and/or bowel cancer? No       Mammogram Screening: Recommended annual mammography  Pertinent mammograms are reviewed under the imaging tab.    History of abnormal Pap smear: NO - age 30-65 PAP every 5 years with negative HPV co-testing recommended      Latest Ref Rng & Units 4/18/2017     9:30 AM 4/18/2017     8:45 AM 11/4/2011     3:19 PM   PAP / HPV   PAP (Historical)  NIL    NIL     HPV 16 DNA NEG  Negative      HPV 18 DNA NEG  Negative      Other HR HPV NEG  Negative        Reviewed and updated as needed this visit by clinical staff   Tobacco  Allergies  Meds  Problems  Med Hx  Surg Hx  Fam Hx  Soc   Hx        Reviewed and updated as needed this visit by Provider   Tobacco  Allergies  Meds  Problems  Med Hx  Surg Hx  Fam Hx  Soc   Hx       Past Medical History:   Diagnosis Date     Allergic rhinitis due to other allergen     seasonal allergies     Hypertension      Irregular menstrual cycle      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   HENT: Negative for congestion, ear pain, hearing loss and sore throat.    Eyes: Negative  "for pain and visual disturbance.   Respiratory: Positive for cough and shortness of breath.    Cardiovascular: Negative for chest pain, palpitations and peripheral edema.   Gastrointestinal: Positive for diarrhea and heartburn. Negative for abdominal pain, constipation, hematochezia and nausea.   Breasts:  Negative for tenderness, breast mass and discharge.   Genitourinary: Negative for dysuria, frequency, genital sores, hematuria, pelvic pain, urgency, vaginal bleeding and vaginal discharge.   Musculoskeletal: Positive for arthralgias. Negative for joint swelling and myalgias.   Skin: Positive for rash.   Neurological: Positive for headaches. Negative for dizziness, weakness and paresthesias.   Psychiatric/Behavioral: Negative for mood changes. The patient is not nervous/anxious.      Cough in AM and shortness of breath with allergies.  Is fine during the day.    Occasional diarrhea more when eating certain foods.  Heartburn ?  Feels more gassy.      Psoriasis on back of left ear.    Muscle pain/joint pain, feeling stiff and needs to change positions when sleeping.    Headaches are from not drinking enough water.     OBJECTIVE:   /86   Pulse 108   Temp 98.8  F (37.1  C) (Tympanic)   Resp 18   Ht 1.684 m (5' 6.3\")   Wt 106 kg (233 lb 9.6 oz)   LMP 02/06/2023 (Approximate)   SpO2 96%   BMI 37.36 kg/m    Physical Exam  GENERAL: healthy, alert and no distress  EYES: Eyes grossly normal to inspection, PERRL and conjunctivae and sclerae normal  HENT: ear canals and TM's normal, nose and mouth without ulcers or lesions  NECK: no adenopathy, no asymmetry, masses, or scars and thyroid normal to palpation  RESP: lungs clear to auscultation - no rales, rhonchi or wheezes  CV: regular rate and rhythm, normal S1 S2, no S3 or S4, no murmur, click or rub, no peripheral edema and peripheral pulses strong  ABDOMEN: soft, nontender, no hepatosplenomegaly, no masses and bowel sounds normal   (female): normal female " external genitalia, normal urethral meatus, vaginal mucosa pink, moist, well rugated, and normal cervix/adnexa/uterus without masses or discharge, PAP collected on exam  MS: no gross musculoskeletal defects noted, no edema  SKIN: no suspicious lesions or rashes  NEURO: Normal strength and tone, mentation intact and speech normal  PSYCH: mentation appears normal, affect normal/bright  LYMPH: normal ant/post cervical, supraclavicular nodes  inguinal: no adenopathy    Diagnostic Test Results:  Labs reviewed in Epic    ASSESSMENT/PLAN:   (Z00.00) Routine general medical examination at a health care facility  (primary encounter diagnosis)  Comment: PAP collected.  Lipid collected due to elevation on last year with 4.8% 10 year ASCVD risk.  Patient has mammogram today scheduled.  She is not due for colonoscopy until 2028.  She declines further influenza and COVID 19 vaccinations, is low risk for Hep B and prefers not to get this and will consider Shingles in the future.  Discussed osteoporosis and advised calcium and vitamin D supplementation.  Handout on preventative care reviewed and given to patient in AVS.  Follow-up recommended in 1 year.  Plan: REVIEW OF HEALTH MAINTENANCE PROTOCOL ORDERS,         Lipid panel reflex to direct LDL Fasting    (Z12.4) Cervical cancer screening  Plan: Pap Screen with HPV - recommended age 30 - 65         years    (E03.9) Hypothyroidism, unspecified type  Comment: TSH ordered for evaluation.  Plan: TSH WITH FREE T4 REFLEX  (E66.01) Obesity (BMI 35.0-39.9) with comorbidity (H)  Comment: Discussed diet and exercise for weight loss.    (I10) Benign essential hypertension  Comment: BMP ordered.  Recheck shows normal BP.  Refilled her lisinopril for 1 year.  Follow-up recommended in 1 year for recheck and refills.  Plan: Basic metabolic panel  (Ca, Cl, CO2, Creat,         Gluc, K, Na, BUN)          (L40.9) Psoriasis  Comment: Ordered Clobetasol solution for scalp psoriasis.  Follow-up as  "needed.  Plan: clobetasol (TEMOVATE) 0.05 % external solution    (K92.89)  Gas bloat syndrome  Comment:  Advised Beano with meals and GasX as needed over the counter.  Handout given on gas and bloating causes.\    (M19.90)  Arthritis  Comment:  Seems to be at night and in the morning with resolution with movement during the day.  Advised Aleve 1-2 tabs at bedtime for relief of symptoms as needed.    Patient has been advised of split billing requirements and indicates understanding: Yes      COUNSELING:  Reviewed preventive health counseling, as reflected in patient instructions       Regular exercise       Healthy diet/nutrition       Vision screening       Immunizations    Declined: Covid-19, Hepatitis B and Influenza due to Conscientious objector        Will consider Shingrex and get this if she decides to.       Osteoporosis prevention/bone health       Advance Care Planning    BMI:   Estimated body mass index is 37.36 kg/m  as calculated from the following:    Height as of this encounter: 1.684 m (5' 6.3\").    Weight as of this encounter: 106 kg (233 lb 9.6 oz).   Weight management plan: Discussed healthy diet and exercise guidelines      She reports that she has never smoked. She has never used smokeless tobacco.    Azeb Wells NP  [facility]  Answers for HPI/ROS submitted by the patient on 4/17/2023  If you checked off any problems, how difficult have these problems made it for you to do your work, take care of things at home, or get along with other people?: Not difficult at all  PHQ9 TOTAL SCORE: 4      "

## 2023-04-18 DIAGNOSIS — E78.5 HYPERLIPIDEMIA LDL GOAL <130: Primary | ICD-10-CM

## 2023-04-18 RX ORDER — SIMVASTATIN 20 MG
20 TABLET ORAL AT BEDTIME
Qty: 90 TABLET | Refills: 3 | Status: SHIPPED | OUTPATIENT
Start: 2023-04-18 | End: 2024-04-24

## 2023-04-19 LAB
BKR LAB AP GYN ADEQUACY: NORMAL
BKR LAB AP GYN INTERPRETATION: NORMAL
BKR LAB AP HPV REFLEX: NORMAL
BKR LAB AP PREVIOUS ABNORMAL: NORMAL
PATH REPORT.COMMENTS IMP SPEC: NORMAL
PATH REPORT.COMMENTS IMP SPEC: NORMAL
PATH REPORT.RELEVANT HX SPEC: NORMAL

## 2023-04-21 LAB
HUMAN PAPILLOMA VIRUS 16 DNA: NEGATIVE
HUMAN PAPILLOMA VIRUS 18 DNA: NEGATIVE
HUMAN PAPILLOMA VIRUS FINAL DIAGNOSIS: NORMAL
HUMAN PAPILLOMA VIRUS OTHER HR: NEGATIVE

## 2023-07-07 ENCOUNTER — ALLIED HEALTH/NURSE VISIT (OUTPATIENT)
Dept: FAMILY MEDICINE | Facility: CLINIC | Age: 53
End: 2023-07-07
Payer: COMMERCIAL

## 2023-07-07 ENCOUNTER — HOSPITAL ENCOUNTER (EMERGENCY)
Facility: CLINIC | Age: 53
Discharge: HOME | End: 2023-07-07
Attending: EMERGENCY MEDICINE | Admitting: EMERGENCY MEDICINE
Payer: COMMERCIAL

## 2023-07-07 VITALS
HEIGHT: 67 IN | TEMPERATURE: 98.5 F | BODY MASS INDEX: 36.88 KG/M2 | WEIGHT: 235 LBS | HEART RATE: 71 BPM | SYSTOLIC BLOOD PRESSURE: 136 MMHG | OXYGEN SATURATION: 93 % | RESPIRATION RATE: 18 BRPM | DIASTOLIC BLOOD PRESSURE: 85 MMHG

## 2023-07-07 VITALS — SYSTOLIC BLOOD PRESSURE: 156 MMHG | HEART RATE: 72 BPM | DIASTOLIC BLOOD PRESSURE: 88 MMHG

## 2023-07-07 DIAGNOSIS — I10 BENIGN ESSENTIAL HYPERTENSION: Primary | ICD-10-CM

## 2023-07-07 DIAGNOSIS — I10 PRIMARY HYPERTENSION: ICD-10-CM

## 2023-07-07 DIAGNOSIS — R07.9 CHEST PAIN, UNSPECIFIED TYPE: ICD-10-CM

## 2023-07-07 LAB
ANION GAP SERPL CALCULATED.3IONS-SCNC: 8 MMOL/L (ref 7–15)
BASOPHILS # BLD AUTO: 0.1 10E3/UL (ref 0–0.2)
BASOPHILS NFR BLD AUTO: 1 %
BUN SERPL-MCNC: 17.1 MG/DL (ref 6–20)
CALCIUM SERPL-MCNC: 9.6 MG/DL (ref 8.6–10)
CHLORIDE SERPL-SCNC: 106 MMOL/L (ref 98–107)
CREAT SERPL-MCNC: 0.9 MG/DL (ref 0.51–0.95)
DEPRECATED HCO3 PLAS-SCNC: 25 MMOL/L (ref 22–29)
EOSINOPHIL # BLD AUTO: 0.1 10E3/UL (ref 0–0.7)
EOSINOPHIL NFR BLD AUTO: 2 %
ERYTHROCYTE [DISTWIDTH] IN BLOOD BY AUTOMATED COUNT: 13.7 % (ref 10–15)
GFR SERPL CREATININE-BSD FRML MDRD: 75 ML/MIN/1.73M2
GLUCOSE SERPL-MCNC: 104 MG/DL (ref 70–99)
HCT VFR BLD AUTO: 42.9 % (ref 35–47)
HGB BLD-MCNC: 13.9 G/DL (ref 11.7–15.7)
HOLD SPECIMEN: NORMAL
IMM GRANULOCYTES # BLD: 0 10E3/UL
IMM GRANULOCYTES NFR BLD: 0 %
LYMPHOCYTES # BLD AUTO: 2.1 10E3/UL (ref 0.8–5.3)
LYMPHOCYTES NFR BLD AUTO: 33 %
MCH RBC QN AUTO: 28 PG (ref 26.5–33)
MCHC RBC AUTO-ENTMCNC: 32.4 G/DL (ref 31.5–36.5)
MCV RBC AUTO: 87 FL (ref 78–100)
MONOCYTES # BLD AUTO: 0.5 10E3/UL (ref 0–1.3)
MONOCYTES NFR BLD AUTO: 8 %
NEUTROPHILS # BLD AUTO: 3.6 10E3/UL (ref 1.6–8.3)
NEUTROPHILS NFR BLD AUTO: 56 %
NRBC # BLD AUTO: 0 10E3/UL
NRBC BLD AUTO-RTO: 0 /100
PLATELET # BLD AUTO: 350 10E3/UL (ref 150–450)
POTASSIUM SERPL-SCNC: 4.1 MMOL/L (ref 3.4–5.3)
RBC # BLD AUTO: 4.96 10E6/UL (ref 3.8–5.2)
SODIUM SERPL-SCNC: 139 MMOL/L (ref 136–145)
TROPONIN T SERPL HS-MCNC: <6 NG/L
WBC # BLD AUTO: 6.4 10E3/UL (ref 4–11)

## 2023-07-07 PROCEDURE — 36415 COLL VENOUS BLD VENIPUNCTURE: CPT | Performed by: EMERGENCY MEDICINE

## 2023-07-07 PROCEDURE — 85025 COMPLETE CBC W/AUTO DIFF WBC: CPT | Performed by: EMERGENCY MEDICINE

## 2023-07-07 PROCEDURE — 84484 ASSAY OF TROPONIN QUANT: CPT | Performed by: EMERGENCY MEDICINE

## 2023-07-07 PROCEDURE — 99207 PR NO CHARGE NURSE ONLY: CPT

## 2023-07-07 PROCEDURE — 93010 ELECTROCARDIOGRAM REPORT: CPT | Performed by: EMERGENCY MEDICINE

## 2023-07-07 PROCEDURE — 99284 EMERGENCY DEPT VISIT MOD MDM: CPT | Mod: 25 | Performed by: EMERGENCY MEDICINE

## 2023-07-07 PROCEDURE — 99284 EMERGENCY DEPT VISIT MOD MDM: CPT | Performed by: EMERGENCY MEDICINE

## 2023-07-07 PROCEDURE — 93005 ELECTROCARDIOGRAM TRACING: CPT | Performed by: EMERGENCY MEDICINE

## 2023-07-07 PROCEDURE — 80048 BASIC METABOLIC PNL TOTAL CA: CPT | Performed by: EMERGENCY MEDICINE

## 2023-07-07 ASSESSMENT — ACTIVITIES OF DAILY LIVING (ADL): ADLS_ACUITY_SCORE: 35

## 2023-07-07 NOTE — NURSING NOTE
"Piper Unger is a 53 year old year old patient who comes in today for a Blood Pressure check because of ongoing blood pressure monitoring. Says that she was \"feeling a little off\" at home, so she checked her BP and had some elevated readings. Pt says she isn't sure if her BP cuff is accurate, so wanted to get checked to make sure. Says that her BP readings at home were 141/91, 137/92, 149/91, 172/96; all of these were from today, and checked a few minutes apart. Pt didn't bring home cuff with her.    Vital Signs as repeated by RN: BP- 162/86, P- 80. Rechecked after 5 minutes: BP- 156/88, P- 72.  Patient is taking medication as prescribed  Patient is tolerating medications well.  Patient is not monitoring Blood Pressure at home.   Current complaints: Pt says that her chest feels a little tight, mid and just to the right. Pt says it's very mild, but she hasn't experienced this discomfort before. Also c/o both arms feeling tight, pt reports as mild discomfort.   Disposition: Huddled with provider/Dr. Abraham, and per Dr. Abraham's recommendation, pt was taken to ED for further evaluation.    Yesica George RN  [facility]  "

## 2023-07-07 NOTE — DISCHARGE INSTRUCTIONS
Continue your home medications.    Monitor blood pressures at home, and recommend bringing chart of recordings to your primary care provider in the next couple of weeks.    Return to be seen if severe worsening of chest pain, or other concerns develop.

## 2023-07-07 NOTE — ED PROVIDER NOTES
History     Chief Complaint   Patient presents with     Hypertension     Pt was over at the clinic and her BP was reading high, BP had been up and down at home. Pt reports today she developed some chest discomfort to mid sternal region. Pt denies lightheaded, dizziness, nausea or vomiting at this time.      Chest Pain     Pt reports mild chest discomfort that started today, rating pain 2/10      HPI  Piper Unger is a 53 year old female who has medical history significant for hypertension, hyperlipidemia, obesity, hypothyroidism, presenting the emergency department with concerns regarding some midsternal chest discomfort, in addition to elevated blood pressure recordings.    Medical record is reviewed, and patient had nurse visit in the clinic this afternoon because she was feeling off at home, and subsequently went to clinic to have her blood pressure checked.  Blood pressure recordings by a nurse in clinic showed values of 162/86, and recheck of 156/88.  Based on concerns that patient was feeling a little off, with some chest tightness during the day today, she was sent to the ED for further evaluation.    Patient currently denies any severe chest pain.  Has had a little tightness in the midsternal region of the chest ever since awakening this morning.  No nausea, vomiting, lightheadedness, diaphoresis, or exertional type symptoms.  Denies any lower extremity swelling.  No recent travel.  No prior history of blood clot.  No prior history of cardiac issues for patient decides hypertension, for which patient has been on lisinopril for many months.  She denies any significant family history of cardiac conditions.  No cough, or respiratory symptoms.    Allergies:  Allergies   Allergen Reactions     Dust Mites      Pollen Extract        Problem List:    Patient Active Problem List    Diagnosis Date Noted     Psoriasis 03/14/2022     Priority: Medium     Eczema, unspecified type 09/28/2020     Priority: Medium      Obesity (BMI 35.0-39.9) with comorbidity (H) 09/23/2019     Priority: Medium     Benign essential hypertension 04/12/2017     Priority: Medium     HYPERLIPIDEMIA LDL GOAL <130 10/31/2010     Priority: Medium     Excessive sweating 08/04/2010     Priority: Medium     Diagnosis updated by automated process. Provider to review and confirm.       Hypothyroidism 10/01/2009     Priority: Medium     Allergic rhinitis due to other allergen      Priority: Medium     seasonal allergies          Past Medical History:    Past Medical History:   Diagnosis Date     Allergic rhinitis due to other allergen      Hypertension      Irregular menstrual cycle      Other congenital anomaly of uterus      Pure hypercholesterolemia      Thyroid disease        Past Surgical History:    Past Surgical History:   Procedure Laterality Date     COLONOSCOPY N/A 3/15/2018    Procedure: COLONOSCOPY;  colonoscopy;  Surgeon: Shahzad Villafana MD;  Location: [facility]     NO HISTORY OF SURGERY         Family History:    Family History   Problem Relation Age of Onset     Hypertension Father      Skin Cancer Father      Atrial fibrillation Father         intermittent     Hypertension Sister      Hyperlipidemia Sister      Sleep Apnea Sister      Hypertension Brother      Sleep Apnea Brother      Glaucoma Maternal Grandmother      Skin Cancer Maternal Grandfather      Hypertension Paternal Grandmother      Cerebrovascular Disease Paternal Grandmother      Lactose Intolerance Daughter      Crohn's Disease Son      Allergies Son         eczema       Social History:  Marital Status:   [5]  Social History     Tobacco Use     Smoking status: Never     Smokeless tobacco: Never   Vaping Use     Vaping Use: Never used   Substance Use Topics     Alcohol use: Yes     Comment: occasional     Drug use: No        Medications:    clobetasol (TEMOVATE) 0.05 % external solution  ketoconazole (NIZORAL) 2 % external shampoo  levocetirizine (XYZAL) 5 MG  "tablet  lisinopril (ZESTRIL) 20 MG tablet  simvastatin (ZOCOR) 20 MG tablet  triamcinolone (KENALOG) 0.1 % external cream          Review of Systems  See HPI  Physical Exam   BP: (!) 180/90  Pulse: 91  Temp: 98.5  F (36.9  C)  Resp: 18  Height: 170.2 cm (5' 7\")  Weight: 106.6 kg (235 lb)  SpO2: 96 %      Physical Exam  BP (!) 137/91   Pulse 76   Temp 98.5  F (36.9  C) (Tympanic)   Resp 20   Ht 1.702 m (5' 7\")   Wt 106.6 kg (235 lb)   SpO2 93%   BMI 36.81 kg/m    General: alert and in no acute distress  Head: atraumatic, normocephalic  Abd: nondistended  CV: [id number].   Musculoskel/Extremities: normal extremities, no apparent edema, and full AROM of major joints  Skin: no rashes, no diaphoresis and skin color normal  Neuro: Patient awake, alert, oriented, speech is fluent, gait is normal  Psychiatric: affect/mood normal, cooperative, normal judgement/insight and memory intact      ED Course                 Procedures              EKG Interpretation:      Interpreted by Shade Ferrera MD  Time reviewed: 1430  Symptoms at time of EKG: chest tightness   Rhythm: normal sinus   Rate: normal  Axis: normal  Ectopy: none  Conduction: normal  ST Segments/ T Waves: No ST-T wave changes  Q Waves: none  Comparison to prior: No old EKG available    Clinical Impression: normal EKG          Critical Care time:  none               Results for orders placed or performed during the hospital encounter of 07/07/23 (from the past 24 hour(s))   [facility] Draw    Narrative    The following orders were created for panel order [facility] Draw.  Procedure                               Abnormality         Status                     ---------                               -----------         ------                     Extra Blue Top Tube[[id number]]                              In process                 Extra Green Top (Lithium...[[id number]]                      In process                 Extra Purple Top Tube[[id number]]                   "          In process                   Please view results for these tests on the individual orders.   CBC with platelets differential    Narrative    The following orders were created for panel order CBC with platelets differential.  Procedure                               Abnormality         Status                     ---------                               -----------         ------                     CBC with platelets and d...[[id number]]                      Final result                 Please view results for these tests on the individual orders.   Basic metabolic panel   Result Value Ref Range    Sodium 139 136 - 145 mmol/L    Potassium 4.1 3.4 - 5.3 mmol/L    Chloride 106 98 - 107 mmol/L    Carbon Dioxide (CO2) 25 22 - 29 mmol/L    Anion Gap 8 7 - 15 mmol/L    Urea Nitrogen 17.1 6.0 - 20.0 mg/dL    Creatinine 0.90 0.51 - 0.95 mg/dL    Calcium 9.6 8.6 - 10.0 mg/dL    Glucose 104 (H) 70 - 99 mg/dL    GFR Estimate 75 >60 mL/min/1.73m2   Troponin T, High Sensitivity   Result Value Ref Range    Troponin T, High Sensitivity <6 <=14 ng/L   CBC with platelets and differential   Result Value Ref Range    WBC Count 6.4 4.0 - 11.0 10e3/uL    RBC Count 4.96 3.80 - 5.20 10e6/uL    Hemoglobin 13.9 11.7 - 15.7 g/dL    Hematocrit 42.9 35.0 - 47.0 %    MCV 87 78 - 100 fL    MCH 28.0 26.5 - 33.0 pg    MCHC 32.4 31.5 - 36.5 g/dL    RDW 13.7 10.0 - 15.0 %    Platelet Count 350 150 - 450 10e3/uL    % Neutrophils 56 %    % Lymphocytes 33 %    % Monocytes 8 %    % Eosinophils 2 %    % Basophils 1 %    % Immature Granulocytes 0 %    NRBCs per 100 WBC 0 <1 /100    Absolute Neutrophils 3.6 1.6 - 8.3 10e3/uL    Absolute Lymphocytes 2.1 0.8 - 5.3 10e3/uL    Absolute Monocytes 0.5 0.0 - 1.3 10e3/uL    Absolute Eosinophils 0.1 0.0 - 0.7 10e3/uL    Absolute Basophils 0.1 0.0 - 0.2 10e3/uL    Absolute Immature Granulocytes 0.0 <=0.4 10e3/uL    Absolute NRBCs 0.0 10e3/uL       Medications - No data to display    Assessments & Plan (with  Medical Decision Making)  53 year old female senting to the emergency department with elevated blood pressure recordings.  Patient arrives with initial blood pressure of 180/89 in triage, however upon rechecks shortly thereafter is noted to have nearly normal blood pressure with value of 137/91.  Has had mild chest discomfort during the day today.  Based on patient's concerns regarding chest pain, in addition to elevated blood pressure, decision made for laboratory testing, and troponin.    Labs are otherwise unremarkable.  Kidney test normal.  Troponin is negative, and therefore acute myocardial infarction has been ruled out.  Based on the atypical type symptoms, I feel ACS is much less likely in this patient without exertional type pains, and primarily had presented due to elevated blood pressure recordings.    I discussed with patient management of her elevated blood pressure, and return instructions if worsening pains, or other new symptoms.      I have reviewed the nursing notes.    I have reviewed the findings, diagnosis, plan and need for follow up with the patient.             New Prescriptions    No medications on file       Final diagnoses:   Primary hypertension   Chest pain, unspecified type       7/7/2023   [facility] EMERGENCY DEPT     Shade Ferrera MD  07/07/23 [id number]

## 2023-07-07 NOTE — ED TRIAGE NOTES
Pt was over at the clinic and her BP was reading high, BP had been up and down at home. Pt reports today she developed some chest discomfort to mid sternal region. Pt denies lightheaded, dizziness, nausea or vomiting at this time.      Triage Assessment     Row Name 07/07/23 1414       Cardiac WDL    Cardiac WDL X

## 2023-07-26 DIAGNOSIS — J30.2 SEASONAL ALLERGIC RHINITIS, UNSPECIFIED TRIGGER: ICD-10-CM

## 2023-07-27 RX ORDER — LEVOCETIRIZINE DIHYDROCHLORIDE 5 MG/1
5 TABLET, FILM COATED ORAL EVERY EVENING
Qty: 90 TABLET | Refills: 0 | Status: SHIPPED | OUTPATIENT
Start: 2023-07-27 | End: 2023-08-07

## 2023-07-27 NOTE — TELEPHONE ENCOUNTER
"Requested Prescriptions   Pending Prescriptions Disp Refills    levocetirizine (XYZAL) 5 MG tablet [Pharmacy Med Name: LEVOCETIRIZINE 5MG TABLET] 90 tablet 0     Sig: TAKE 1 TABLET (5 MG) BY MOUTH EVERY EVENING       Antihistamines Protocol Failed - 7/26/2023  5:40 PM        Failed - Recent (12 mo) or future (30 days) visit within the authorizing provider's specialty     Patient has had an office visit with the authorizing provider or a provider within the authorizing providers department within the previous 12 mos or has a future within next 30 days. See \"Patient Info\" tab in inbasket, or \"Choose Columns\" in Meds & Orders section of the refill encounter.              Passed - Patient is 3-64 years of age     Apply weight-based dosing for peds patients age 3 - 12 years of age.    Forward request to provider for patients under the age of 3 or over the age of 64.          Passed - Medication is active on med list             "

## 2023-08-01 ENCOUNTER — TELEPHONE (OUTPATIENT)
Dept: FAMILY MEDICINE | Facility: CLINIC | Age: 53
End: 2023-08-01
Payer: COMMERCIAL

## 2023-08-01 NOTE — LETTER
August 1, 2023    To  Piper Unger  [address] [facility] [address]    Your team at [facility] cares about your health. We have reviewed your chart and based on our findings; we are making the following recommendations to better manage your health.     You are in particular need of attention regarding the following:     HYPERTENSION FOLLOW UP: Office Visit    If you have already completed these items, please contact the clinic via phone or   MyChart so your care team can review and update your records. Thank you for   choosing [facility] for your healthcare needs. For any questions,   concerns, or to schedule an appointment please contact our clinic.    Healthy Regards,      Your [facility] Care Team

## 2023-08-01 NOTE — TELEPHONE ENCOUNTER
Patient Quality Outreach    Patient is due for the following:   Hypertension -  Hypertension follow-up visit    Next Steps:   Pt to schedule  Should also have a follow up for hospital visit on 07/07/2023 elevated blood pressure   Type of outreach:    Phone, left message for patient/parent to call back. and Sent letter.      Questions for provider review:    None           Anibal Francisco

## 2023-08-07 ENCOUNTER — VIRTUAL VISIT (OUTPATIENT)
Dept: FAMILY MEDICINE | Facility: CLINIC | Age: 53
End: 2023-08-07
Payer: COMMERCIAL

## 2023-08-07 DIAGNOSIS — I10 BENIGN ESSENTIAL HYPERTENSION: Primary | ICD-10-CM

## 2023-08-07 DIAGNOSIS — R07.89 FEELING OF CHEST TIGHTNESS: ICD-10-CM

## 2023-08-07 DIAGNOSIS — J30.2 SEASONAL ALLERGIC RHINITIS, UNSPECIFIED TRIGGER: ICD-10-CM

## 2023-08-07 PROCEDURE — 99213 OFFICE O/P EST LOW 20 MIN: CPT | Mod: VID | Performed by: NURSE PRACTITIONER

## 2023-08-07 RX ORDER — LEVOCETIRIZINE DIHYDROCHLORIDE 5 MG/1
5 TABLET, FILM COATED ORAL EVERY EVENING
Qty: 90 TABLET | Refills: 3 | Status: SHIPPED | OUTPATIENT
Start: 2023-08-07 | End: 2024-07-02

## 2023-08-07 NOTE — PROGRESS NOTES
Piper is a 53 year old who is being evaluated via a billable video visit.      How would you like to obtain your AVS? MyChart  If the video visit is dropped, the invitation should be resent by: Text to cell phone: [phone]  Will anyone else be joining your video visit? No    Assessment & Plan     Benign essential hypertension  Patient is currently stable with her blood pressure and no symptoms of chest tightness since early July.  We will plan on continuing blood pressure medication at current dosing and follow-up only if she starts having any recurrent symptoms for further evaluation with Holter monitor since EKG was normal in ER.  We discussed possible causes but I just say that is very unlikely that we will know exactly why she felt the symptoms she did that day.  Patient verbalized understanding of plan.    Feeling of chest tightness  Resolved.    Seasonal allergic rhinitis, unspecified trigger  Stable.  Refilling Xyzal for 1 year.  - levocetirizine (XYZAL) 5 MG tablet; Take 1 tablet (5 mg) by mouth every evening     MED REC REQUIRED    Post Medication Reconciliation Status: discharge medications reconciled, continue medications without change    See Patient Instructions    Azeb Wells NP  [facility]    Antonio Saldaña is a 53 year old, presenting for the following health issues:  Hospital F/U      8/7/2023     1:59 PM   Additional Questions   Roomed by rmb   Accompanied by self         8/7/2023     1:59 PM   Patient Reported Additional Medications   Patient reports taking the following new medications none       HPI     ED/UC Followup:    Facility:  [facility]  Date of visit: 07/07/2023  Reason for visit: high blood pressure   Current Status: discharged     Patient was seen in the ER back on 7/7/2023 for chest pain and elevated blood pressure.  Patient described the symptoms more as chest and left arm tightness.  She denies any of those symptoms since that ER visit.  Her blood  pressure at home has been around 128/86 with occasional slightly higher and lower.  She states she only had 1 blood pressure that was 148/96 otherwise all of them are lower than 140/90.  Patient does admit that she probably did not drink a lot of water that day when the symptoms were happening.  She does not remember any high stress or anxiety during that time.  Currently she is asymptomatic and feels fine.      Review of Systems   CONSTITUTIONAL: NEGATIVE for fever, chills, change in weight  RESP: NEGATIVE for significant cough or SOB  CV: NEGATIVE for chest pain, palpitations or peripheral edema  PSYCHIATRIC: NEGATIVE for changes in mood or affect  ROS otherwise negative      Objective           Vitals:  No vitals were obtained today due to virtual visit.    Physical Exam   GENERAL: Healthy, alert and no distress  EYES: Eyes grossly normal to inspection.  No discharge or erythema, or obvious scleral/conjunctival abnormalities.  RESP: No audible wheeze, cough, or visible cyanosis.  No visible retractions or increased work of breathing.    SKIN: Visible skin clear. No significant rash, abnormal pigmentation or lesions.  NEURO: Cranial nerves grossly intact.  Mentation and speech appropriate for age.  PSYCH: Mentation appears normal, affect normal/bright, judgement and insight intact, normal speech and appearance well-groomed.    Video-Visit Details    Type of service:  Video Visit     Originating Location (pt. Location): Home    Distant Location (provider location):  On-site  Platform used for Video Visit: [employer]

## 2023-08-08 NOTE — PATIENT INSTRUCTIONS
Continue Lisinopril at current dose.  If any recurrent chest tightness, follow-up in clinic or call and we can get holter monitor placed.  Xyzal refilled for 1 year for your allergies.

## 2023-08-14 ENCOUNTER — MYC MEDICAL ADVICE (OUTPATIENT)
Dept: FAMILY MEDICINE | Facility: CLINIC | Age: 53
End: 2023-08-14
Payer: COMMERCIAL

## 2023-08-14 DIAGNOSIS — G47.30 SLEEP APNEA, UNSPECIFIED TYPE: Primary | ICD-10-CM

## 2023-08-14 NOTE — TELEPHONE ENCOUNTER
Pls see [employer] message below.     Date of last VV with Azeb Wells CNP: 8/7/23, OV: 4/17/23.    Referral pended and message routed to provider for consideration.     Julie Behrendt RN

## 2023-09-21 ENCOUNTER — TELEPHONE (OUTPATIENT)
Dept: FAMILY MEDICINE | Facility: CLINIC | Age: 53
End: 2023-09-21
Payer: COMMERCIAL

## 2023-09-21 NOTE — TELEPHONE ENCOUNTER
Patient Quality Outreach    Patient is due for the following:   Hypertension -  BP check    Next Steps:   Pt to schedule nurse visit     Type of outreach:    Phone, left message for patient/parent to call back.      Questions for provider review:    None           Anibal Francisco

## 2023-09-26 ENCOUNTER — TELEPHONE (OUTPATIENT)
Dept: FAMILY MEDICINE | Facility: CLINIC | Age: 53
End: 2023-09-26
Payer: COMMERCIAL

## 2023-09-26 NOTE — TELEPHONE ENCOUNTER
Arely is calling from [facility] inquiring about ordering and diagnosis codes for sleep study test/referral. She was transferred to the billing department.    Kathleen Candelaria RN

## 2023-09-26 NOTE — TELEPHONE ENCOUNTER
Order/Referral Request    Who is requesting: [facility]    Orders being requested: Updated Sleep referral for at home it has to state     Reason service is needed/diagnosis: sleep apnea    When are orders needed by: asap    Has this been discussed with Provider: Yes    Does patient have a preference on a Group/Provider/Facility? [facility]    Where to send orders: Fax: [phone]    Could we send this information to you in [employer] or would you prefer to receive a phone call?:   Patient would prefer a phone call   Okay to leave a detailed message?: Yes at Cell number on file:    Telephone Information:   Mobile [phone]   Mobile [phone]     .Dior Connell PSC

## 2023-09-27 NOTE — TELEPHONE ENCOUNTER
"  Order/Referral Request    Who is requesting: [facility]     Orders being requested: need orders to be updated to state \"at home\" sleep study    Reason service is needed/diagnosis: sleep apnea    When are orders needed by: asap    Has this been discussed with Provider: Yes    Does patient have a preference on a Group/Provider/Facility? [facility]    Does patient have an appointment scheduled?: No    Where to send orders: Fax [phone]    Could we send this information to you in [employer] or would you prefer to receive a phone call?:   Patient would prefer a phone call   Okay to leave a detailed message?: Yes at Home number on file [phone] (home)    "

## 2023-09-28 NOTE — TELEPHONE ENCOUNTER
I put in an order for sleep referral for her to consult for need of home or in clinic testing.  She needs to consult prior to this determination.  I typically do not put these orders in the sleep clinic will.  Azeb Wells NP on 9/28/2023 at 11:00 AM

## 2023-09-28 NOTE — TELEPHONE ENCOUNTER
Called & spoke with Maureen at [facility].  She states Arely, is on another call.  Read providers message & she will get the message to Arely to update.   No further questions at this time.  Closing this encounter.    Patsy Rueda RN

## 2024-02-16 ENCOUNTER — MYC MEDICAL ADVICE (OUTPATIENT)
Dept: FAMILY MEDICINE | Facility: CLINIC | Age: 54
End: 2024-02-16
Payer: COMMERCIAL

## 2024-02-16 NOTE — TELEPHONE ENCOUNTER
Patient Quality Outreach    Patient is due for the following:   Hypertension -  BP check    Next Steps:   Schedule a nurse only visit for blood pressure check.    Type of outreach:    Sent [employer] on [employer] message.    Next Steps:  Reach out within 90 days via Letter.    Max number of attempts reached: No. Will try again in 90 days if patient still on fail list.    Questions for provider review:    None           Azeb Wells NP

## 2024-02-22 NOTE — TELEPHONE ENCOUNTER
"Requested Prescriptions   Pending Prescriptions Disp Refills     lisinopril (PRINIVIL/ZESTRIL) 5 MG tablet [Pharmacy Med Name: LISINOPRIL 5MG TABLET] 90 tablet 3     Sig: TAKE 1 TABLET BY MOUTH DAILY   Last Written Prescription Date:  8/14/18  Last Fill Quantity: 90 tab,  # refills: 3   Last office visit: 3/13/2019 with prescribing provider:  LILLY Mantilla   Future Office Visit:        ACE Inhibitors (Including Combos) Protocol Failed - 8/21/2019  1:00 AM        Failed - Normal serum creatinine on file in past 12 months     Recent Labs   Lab Test 07/22/18  1542   CR 0.70             Failed - Normal serum potassium on file in past 12 months     Recent Labs   Lab Test 07/22/18  1542   POTASSIUM 3.9             Passed - Blood pressure under 140/90 in past 12 months     BP Readings from Last 3 Encounters:   03/13/19 122/80   07/22/18 146/89   05/15/18 151/89                 Passed - Recent (12 mo) or future (30 days) visit within the authorizing provider's specialty     Patient had office visit in the last 12 months or has a visit in the next 30 days with authorizing provider or within the authorizing provider's specialty.  See \"Patient Info\" tab in inbasket, or \"Choose Columns\" in Meds & Orders section of the refill encounter.              Passed - Medication is active on med list        Passed - Patient is age 18 or older        Passed - No active pregnancy on record        Passed - No positive pregnancy test within past 12 months          " [de-identified] : Her last physical exam was last year  Vaccines: Tetanus is NOT up to date Pneumococcal vaccination is NOT up to date Shingrix is NOT up to date  Her last dentist visit was less than one year ago Her last eye doctor appointment was less than one year ago Her last dermatologist visit was a few years ago  GYN visit is up to date, [facility] Mammogram is up to date ? Colon cancer screening is NOT up to date; colonoscopy 5/25/2016, 3yr f/u recommended DEXA is NOT up to date; last 12/2016  Her diet is healthy overall Exercise: active in daily life but no regular exercise  [FreeTextEntry1] : ROMULO EGAN is a 68 year old female here for a physical exam.

## 2024-04-24 DIAGNOSIS — E78.5 HYPERLIPIDEMIA LDL GOAL <130: ICD-10-CM

## 2024-04-24 DIAGNOSIS — I10 BENIGN ESSENTIAL HYPERTENSION: ICD-10-CM

## 2024-04-24 RX ORDER — LISINOPRIL 20 MG/1
20 TABLET ORAL DAILY
Qty: 90 TABLET | Refills: 0 | Status: SHIPPED | OUTPATIENT
Start: 2024-04-24 | End: 2024-07-02

## 2024-04-24 NOTE — TELEPHONE ENCOUNTER
Pending Prescriptions:                       Disp   Refills    simvastatin (ZOCOR) 20 MG tablet          90 tab*3            Sig: Take 1 tablet (20 mg) by mouth at bedtime

## 2024-04-24 NOTE — TELEPHONE ENCOUNTER
Pending Prescriptions:                       Disp   Refills    lisinopril (ZESTRIL) 20 MG tablet         90 tab*3            Sig: Take 1 tablet (20 mg) by mouth daily

## 2024-04-25 RX ORDER — SIMVASTATIN 20 MG
20 TABLET ORAL AT BEDTIME
Qty: 90 TABLET | Refills: 0 | Status: SHIPPED | OUTPATIENT
Start: 2024-04-25 | End: 2024-07-02

## 2024-05-13 ENCOUNTER — MYC MEDICAL ADVICE (OUTPATIENT)
Dept: FAMILY MEDICINE | Facility: CLINIC | Age: 54
End: 2024-05-13
Payer: COMMERCIAL

## 2024-05-13 NOTE — TELEPHONE ENCOUNTER
Patient Quality Outreach    Patient is due for the following:   Hypertension -  BP check    Next Steps:   Schedule a nurse only visit for blood pressure check    Type of outreach:    Sent [employer] message.    Next Steps:  Reach out within 90 days via Letter.    Max number of attempts reached: No. Will try again in 90 days if patient still on fail list.    Questions for provider review:    None     Azeb Wells, NP

## 2024-06-16 ENCOUNTER — HEALTH MAINTENANCE LETTER (OUTPATIENT)
Age: 54
End: 2024-06-16

## 2024-07-02 ENCOUNTER — OFFICE VISIT (OUTPATIENT)
Dept: FAMILY MEDICINE | Facility: CLINIC | Age: 54
End: 2024-07-02
Payer: COMMERCIAL

## 2024-07-02 VITALS
DIASTOLIC BLOOD PRESSURE: 78 MMHG | HEIGHT: 67 IN | TEMPERATURE: 98.7 F | SYSTOLIC BLOOD PRESSURE: 146 MMHG | RESPIRATION RATE: 16 BRPM | OXYGEN SATURATION: 95 % | WEIGHT: 244 LBS | HEART RATE: 101 BPM | BODY MASS INDEX: 38.3 KG/M2

## 2024-07-02 DIAGNOSIS — L40.9 PSORIASIS: ICD-10-CM

## 2024-07-02 DIAGNOSIS — L30.9 ECZEMA, UNSPECIFIED TYPE: ICD-10-CM

## 2024-07-02 DIAGNOSIS — G47.33 OSA (OBSTRUCTIVE SLEEP APNEA): ICD-10-CM

## 2024-07-02 DIAGNOSIS — J30.2 SEASONAL ALLERGIC RHINITIS, UNSPECIFIED TRIGGER: ICD-10-CM

## 2024-07-02 DIAGNOSIS — Z12.31 ENCOUNTER FOR SCREENING MAMMOGRAM FOR BREAST CANCER: ICD-10-CM

## 2024-07-02 DIAGNOSIS — E78.5 HYPERLIPIDEMIA LDL GOAL <130: ICD-10-CM

## 2024-07-02 DIAGNOSIS — L21.9 DERMATITIS, SEBORRHEIC: ICD-10-CM

## 2024-07-02 DIAGNOSIS — Z00.00 ROUTINE GENERAL MEDICAL EXAMINATION AT A HEALTH CARE FACILITY: Primary | ICD-10-CM

## 2024-07-02 DIAGNOSIS — I10 BENIGN ESSENTIAL HYPERTENSION: ICD-10-CM

## 2024-07-02 LAB
ANION GAP SERPL CALCULATED.3IONS-SCNC: 8 MMOL/L (ref 7–15)
BUN SERPL-MCNC: 12.7 MG/DL (ref 6–20)
CALCIUM SERPL-MCNC: 9.1 MG/DL (ref 8.6–10)
CHLORIDE SERPL-SCNC: 105 MMOL/L (ref 98–107)
CHOLEST SERPL-MCNC: 205 MG/DL
CREAT SERPL-MCNC: 0.79 MG/DL (ref 0.51–0.95)
DEPRECATED HCO3 PLAS-SCNC: 24 MMOL/L (ref 22–29)
EGFRCR SERPLBLD CKD-EPI 2021: 87 ML/MIN/1.73M2
FASTING STATUS PATIENT QL REPORTED: YES
FASTING STATUS PATIENT QL REPORTED: YES
GLUCOSE SERPL-MCNC: 101 MG/DL (ref 70–99)
HDLC SERPL-MCNC: 44 MG/DL
LDLC SERPL CALC-MCNC: 113 MG/DL
NONHDLC SERPL-MCNC: 161 MG/DL
POTASSIUM SERPL-SCNC: 4.3 MMOL/L (ref 3.4–5.3)
SODIUM SERPL-SCNC: 137 MMOL/L (ref 135–145)
TRIGL SERPL-MCNC: 241 MG/DL

## 2024-07-02 PROCEDURE — 80048 BASIC METABOLIC PNL TOTAL CA: CPT | Performed by: NURSE PRACTITIONER

## 2024-07-02 PROCEDURE — 80061 LIPID PANEL: CPT | Performed by: NURSE PRACTITIONER

## 2024-07-02 PROCEDURE — 36415 COLL VENOUS BLD VENIPUNCTURE: CPT | Performed by: NURSE PRACTITIONER

## 2024-07-02 PROCEDURE — 99396 PREV VISIT EST AGE 40-64: CPT | Performed by: NURSE PRACTITIONER

## 2024-07-02 PROCEDURE — 99214 OFFICE O/P EST MOD 30 MIN: CPT | Mod: 25 | Performed by: NURSE PRACTITIONER

## 2024-07-02 RX ORDER — CLOBETASOL PROPIONATE 0.5 MG/ML
SOLUTION TOPICAL 2 TIMES DAILY
Qty: 50 ML | Refills: 6 | Status: SHIPPED | OUTPATIENT
Start: 2024-07-02

## 2024-07-02 RX ORDER — SIMVASTATIN 20 MG
20 TABLET ORAL AT BEDTIME
Qty: 90 TABLET | Refills: 3 | Status: SHIPPED | OUTPATIENT
Start: 2024-07-02

## 2024-07-02 RX ORDER — TRIAMCINOLONE ACETONIDE 1 MG/G
CREAM TOPICAL 2 TIMES DAILY
Qty: 30 G | Refills: 1 | Status: SHIPPED | OUTPATIENT
Start: 2024-07-02

## 2024-07-02 RX ORDER — KETOCONAZOLE 20 MG/ML
SHAMPOO TOPICAL
Qty: 120 ML | Refills: 3 | Status: SHIPPED | OUTPATIENT
Start: 2024-07-02

## 2024-07-02 RX ORDER — CLOBETASOL PROPIONATE 0.5 MG/G
OINTMENT TOPICAL 2 TIMES DAILY
Qty: 30 G | Refills: 11 | Status: SHIPPED | OUTPATIENT
Start: 2024-07-02

## 2024-07-02 RX ORDER — LEVOCETIRIZINE DIHYDROCHLORIDE 5 MG/1
5 TABLET, FILM COATED ORAL EVERY EVENING
Qty: 90 TABLET | Refills: 3 | Status: SHIPPED | OUTPATIENT
Start: 2024-07-02

## 2024-07-02 RX ORDER — LISINOPRIL 20 MG/1
20 TABLET ORAL DAILY
Qty: 90 TABLET | Refills: 3 | Status: SHIPPED | OUTPATIENT
Start: 2024-07-02

## 2024-07-02 SDOH — HEALTH STABILITY: PHYSICAL HEALTH: ON AVERAGE, HOW MANY DAYS PER WEEK DO YOU ENGAGE IN MODERATE TO STRENUOUS EXERCISE (LIKE A BRISK WALK)?: 0 DAYS

## 2024-07-02 ASSESSMENT — SOCIAL DETERMINANTS OF HEALTH (SDOH): HOW OFTEN DO YOU GET TOGETHER WITH FRIENDS OR RELATIVES?: ONCE A WEEK

## 2024-07-02 ASSESSMENT — PAIN SCALES - GENERAL: PAINLEVEL: NO PAIN (0)

## 2024-07-02 NOTE — PROGRESS NOTES
Preventive Care Visit  [facility]  Azeb Wells NP, Family Medicine  Jul 2, 2024    Assessment & Plan     Routine general medical examination at a health care facility  Reviewed problem list, medications, allergies and history.  No vaccinations are due today.  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  Mammogram ordered for screening.  Reviewed preventive health recommendations and advised follow-up in 1 year for annual physical exam.  - REVIEW OF HEALTH MAINTENANCE PROTOCOL ORDERS  - PRIMARY CARE FOLLOW-UP SCHEDULING; Future    Encounter for screening mammogram for breast cancer  - MA Screen Bilateral w/Jaylen; Future    Hyperlipidemia LDL goal <130  Lipid ordered for monitoring.  Refilled simvastatin for 1 year.  - Lipid panel reflex to direct LDL Non-fasting; Future  - simvastatin (ZOCOR) 20 MG tablet; Take 1 tablet (20 mg) by mouth at bedtime  - Lipid panel reflex to direct LDL Non-fasting    Benign essential hypertension  Blood pressure is mildly elevated but usually is normal at home.  BMP ordered for monitoring and refilled lisinopril for 1 year.  - Basic metabolic panel  (Ca, Cl, CO2, Creat, Gluc, K, Na, BUN); Future  - lisinopril (ZESTRIL) 20 MG tablet; Take 1 tablet (20 mg) by mouth daily  - Basic metabolic panel  (Ca, Cl, CO2, Creat, Gluc, K, Na, BUN)    Seasonal allergic rhinitis, unspecified trigger  Stable.  Refilled Xyzal for 1 year.  - levocetirizine (XYZAL) 5 MG tablet; Take 1 tablet (5 mg) by mouth every evening    Dermatitis, seborrheic  Refilled Nizoral cream for scalp dermatitis.  - ketoconazole (NIZORAL) 2 % external shampoo; APPLY TO AFFECTED AREA AND WASH OFF AFTER 5 MINUTES. USE 2-3 TIMES PER WEEK.    Psoriasis  Refilled clobetasol solution for scalp psoriasis and filled clobetasol cream external ointment to apply twice a day to  "breast and behind the ear where she has her Psoriatec lesions.  Discussed that if this is not improving symptoms, would refer to dermatology for further evaluation and treatment.  - clobetasol (TEMOVATE) 0.05 % external solution; Apply topically 2 times daily  - clobetasol (TEMOVATE) 0.05 % external ointment; Apply topically 2 times daily To breast and behind the ear    Eczema, unspecified type  I did refill the Kenalog cream in case the symptoms are mild but did let her know that she could use either the clobetasol or the triamcinolone twice daily but not both  - triamcinolone (KENALOG) 0.1 % external cream; Apply topically 2 times daily For up to 2 weeks, then take one week off and repeat as needed    INGA (Obstructive Sleep Apnea)  Referral placed to discuss this with a sleep provider with alternatives to CPAP due to not tolerating the machine and loss of sleep.    Patient has been advised of split billing requirements and indicates understanding: Yes        BMI  Estimated body mass index is 38.57 kg/m  as calculated from the following:    Height as of this encounter: 1.694 m (5' 6.69\").    Weight as of this encounter: 110.7 kg (244 lb).   Weight management plan: Discussed healthy diet and exercise guidelines    Counseling  Appropriate preventive services were discussed with this patient, including applicable screening as appropriate for fall prevention, nutrition, physical activity, Tobacco-use cessation, weight loss and cognition.  Checklist reviewing preventive services available has been given to the patient.  Reviewed patient's diet, addressing concerns and/or questions.       See Patient Instructions    Antonio Saldaña is a 54 year old, presenting for the following:  Physical        7/2/2024     1:07 PM   Additional Questions   Roomed by rmb   Accompanied by self         7/2/2024     1:07 PM   Patient Reported Additional Medications   Patient reports taking the following new medications none    "     Health Care Directive  Patient does not have a Health Care Directive or Living Will: Discussed advance care planning with patient; information given to patient to review.    HPI    Breast Concern  Onset/Duration: last fall  Description:   Location: nipple area  Pain or tenderness: YES  Redness: YES  Intensity: moderate  Progression of Symptoms: same  Accompanying Signs & Symptoms:  Any lumps in axillary region: No  Movable: No  Nipple discharge: thin or yellow  Changes in the skin or nipple: redness  On Hormone therapy: No  Does it change with menstrual cycle: No  Previous history of similar problem: none  First degree relative with breast cancer: a negative family history for breast cancer.  Precipitating factors:           Worsened by: none  Alleviating factors:            Improved by: none  Therapies tried and outcome: None  Patient has raised red area around the left nipple with cracking and scale.  She states that sometimes will be some drainage from it as well.  She also gets rashes under the breast occasionally with sweating.    Patient also wants to discuss alternatives for sleep apnea since she does not tolerate her CPAP machine.        7/2/2024   General Health   How would you rate your overall physical health? (!) FAIR            4/17/2023   Nutrition   Three or more servings of calcium each day? No   Diet: regular (no restrictions)            4/17/2023   Exercise   Frequency of exercise: None               4/17/2023   Dental   Dentist two times every year? Yes            4/17/2023   Substance Use   Alcohol more than 3/day or more than 7/wk No        Social History     Tobacco Use    Smoking status: Never    Smokeless tobacco: Never   Vaping Use    Vaping status: Never Used   Substance Use Topics    Alcohol use: Yes     Comment: occasional    Drug use: No         4/17/2023   LAST FHS-7 RESULTS   1st degree relative breast or ovarian cancer No   Any relative bilateral breast cancer No   Any male have  breast cancer No   Any ONE woman have BOTH breast AND ovarian cancer No   Any woman with breast cancer before 50yrs No   2 or more relatives with breast AND/OR ovarian cancer No   2 or more relatives with breast AND/OR bowel cancer No      Mammogram Screening - Mammogram every 1-2 years updated in Health Maintenance based on mutual decision making      History of abnormal Pap smear: No - age 30- 64 PAP with HPV every 5 years recommended        Latest Ref Rng & Units 4/17/2023    10:50 AM 4/18/2017     9:30 AM 4/18/2017     8:45 AM   PAP / HPV   PAP  Negative for Intraepithelial Lesion or Malignancy (NILM)      PAP (Historical)   NIL     HPV 16 DNA Negative Negative   Negative    HPV 18 DNA Negative Negative   Negative    Other HR HPV Negative Negative   Negative      ASCVD Risk   The 10-year ASCVD risk score (Sowmya DEVRIES, et al., 2019) is: 4.8%    Values used to calculate the score:      Age: 54 years      Sex: Female      Is Non- : No      Diabetic: No      Tobacco smoker: No      Systolic Blood Pressure: 146 mmHg      Is BP treated: Yes      HDL Cholesterol: 44 mg/dL      Total Cholesterol: 205 mg/dL    Reviewed and updated as needed this visit by Provider   Tobacco  Allergies  Meds  Problems   Surg Hx  Fam Hx  Soc Hx Sexual   Activity          Past Medical History:   Diagnosis Date    Allergic rhinitis due to other allergen     seasonal allergies    Hypertension     Irregular menstrual cycle     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, bed rest, 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, bicornate uterus    Pure hypercholesterolemia     Thyroid disease      Past Surgical History:   Procedure Laterality Date    COLONOSCOPY N/A 3/15/2018    Procedure: COLONOSCOPY;  colonoscopy;  Surgeon: Shahzad Villafana MD;  Location: [facility]    NO HISTORY OF SURGERY       Lab work is in process  Labs reviewed in EPIC  BP Readings from Last 3 Encounters:   07/02/24 (!) 146/78   07/07/23 136/85   07/07/23 (!) 156/88    Wt  Readings from Last 3 Encounters:   07/02/24 110.7 kg (244 lb)   07/07/23 106.6 kg (235 lb)   04/17/23 106 kg (233 lb 9.6 oz)                  Patient Active Problem List   Diagnosis    Allergic rhinitis due to other allergen    Excessive sweating    HYPERLIPIDEMIA LDL GOAL <130    Benign essential hypertension    Obesity (BMI 35.0-39.9) with comorbidity (H)    Eczema, unspecified type    Psoriasis     Past Surgical History:   Procedure Laterality Date    COLONOSCOPY N/A 3/15/2018    Procedure: COLONOSCOPY;  colonoscopy;  Surgeon: Shahzad Villafana MD;  Location: [facility]    NO HISTORY OF SURGERY         Social History     Tobacco Use    Smoking status: Never    Smokeless tobacco: Never   Substance Use Topics    Alcohol use: Yes     Comment: occasional     Family History   Problem Relation Age of Onset    Hypertension Father     Skin Cancer Father     Atrial fibrillation Father         intermittent    Hypertension Sister     Hyperlipidemia Sister     Sleep Apnea Sister     Hypertension Brother     Sleep Apnea Brother     Glaucoma Maternal Grandmother     Skin Cancer Maternal Grandfather     Hypertension Paternal Grandmother     Cerebrovascular Disease Paternal Grandmother     Lactose Intolerance Daughter     Crohn's Disease Son     Allergies Son         eczema         Current Outpatient Medications   Medication Sig Dispense Refill    clobetasol (TEMOVATE) 0.05 % external ointment Apply topically 2 times daily To breast and behind the ear 30 g 11    clobetasol (TEMOVATE) 0.05 % external solution Apply topically 2 times daily 50 mL 6    ketoconazole (NIZORAL) 2 % external shampoo APPLY TO AFFECTED AREA AND WASH OFF AFTER 5 MINUTES. USE 2-3 TIMES PER WEEK. 120 mL 3    levocetirizine (XYZAL) 5 MG tablet Take 1 tablet (5 mg) by mouth every evening 90 tablet 3    lisinopril (ZESTRIL) 20 MG tablet Take 1 tablet (20 mg) by mouth daily 90 tablet 3    simvastatin (ZOCOR) 20 MG tablet Take 1 tablet (20 mg) by mouth at bedtime 90  "tablet 3    triamcinolone (KENALOG) 0.1 % external cream Apply topically 2 times daily For up to 2 weeks, then take one week off and repeat as needed 30 g 1     Allergies   Allergen Reactions    Dust Mites     Pollen Extract      Recent Labs   Lab Test 07/02/24  1359 07/07/23  1430 04/17/23  1135 03/14/22  1332 03/14/22  1332 12/18/20  0800 09/23/19  1445   *  --  192*  --   --  192*  --    HDL 44*  --  42*  --   --  55  --    TRIG 241*  --  444*  --   --  223*  --    CR 0.79 0.90 0.84   < > 0.70 0.63 0.73   GFRESTIMATED 87 75 82   < > >90 >90 >90   GFRESTBLACK  --   --   --   --   --  >90 >90   POTASSIUM 4.3 4.1 4.1   < > 4.2 4.0 3.8   TSH  --   --  2.48  --  2.42 1.59 1.91    < > = values in this interval not displayed.          Review of Systems  CONSTITUTIONAL: NEGATIVE for fever, chills, change in weight  INTEGUMENTARY/SKIN: NEGATIVE for scalp dermatitis, psoriasis behind left ear and on nipple of the breast  EYES: NEGATIVE for vision changes or irritation  ENT/MOUTH: NEGATIVE for ear, mouth and throat problems  RESP: NEGATIVE for significant cough or SOB  BREAST: NEGATIVE for masses, tenderness or discharge  CV: NEGATIVE for chest pain, palpitations or peripheral edema  GI: NEGATIVE for nausea, abdominal pain, heartburn, or change in bowel habits  : post menopausal  MUSCULOSKELETAL: NEGATIVE for significant arthralgias or myalgia  NEURO: NEGATIVE for weakness, dizziness or paresthesias  ENDOCRINE: NEGATIVE for temperature intolerance, skin/hair changes  HEME/ALLERGY/IMMUNE: NEGATIVE for bleeding problems  PSYCHIATRIC: NEGATIVE for changes in mood or affect     Objective    Exam  BP (!) 146/78   Pulse 101   Temp 98.7  F (37.1  C) (Tympanic)   Resp 16   Ht 1.694 m (5' 6.69\")   Wt 110.7 kg (244 lb)   LMP  (LMP Unknown)   SpO2 95%   BMI 38.57 kg/m     Estimated body mass index is 38.57 kg/m  as calculated from the following:    Height as of this encounter: 1.694 m (5' 6.69\").    Weight as of this " encounter: 110.7 kg (244 lb).    Physical Exam  GENERAL: alert and no distress  EYES: Eyes grossly normal to inspection, PERRL and conjunctivae and sclerae normal  HENT: ear canals and TM's normal, nose and mouth without ulcers or lesions  NECK: no adenopathy, no asymmetry, masses, or scars  RESP: lungs clear to auscultation - no rales, rhonchi or wheezes  CV: regular rate and rhythm, normal S1 S2, no S3 or S4, no murmur, click or rub, no peripheral edema  ABDOMEN: soft, nontender, no hepatosplenomegaly, no masses and bowel sounds normal  MS: no gross musculoskeletal defects noted, no edema  SKIN: scale lesions in plaque formation behind the left ear on the the lower right areola of the left breast, both consistent with psoriasis  NEURO: Normal strength and tone, mentation intact and speech normal  PSYCH: mentation appears normal, affect normal/bright  LYMPH: normal ant/post cervical, supraclavicular nodes    Signed Electronically by: Azeb Wells NP

## 2024-07-02 NOTE — PATIENT INSTRUCTIONS
"Try Beano with meals and bedtime to help with gas.  If not improving, try pepcid 10-20 mg twice daily.  If still occurring let me know and I will refer to Gastroenterology.    If no improvement with the psoriasis on the nipple and behind the ear, we may want to consider dermatology referral for other treatments.    Patient Education   Preventive Care Advice   This is general advice we often give to help people stay healthy. Your care team may have specific advice just for you. Please talk to your care team about your own preventive care needs.  Lifestyle  Exercise at least 150 minutes each week (30 minutes a day, 5 days a week).  Do muscle strengthening activities 2 days a week. These help control your weight and prevent disease.  No smoking.  Wear sunscreen to prevent skin cancer.  Have your home tested for radon every 2 to 5 years. Radon is a colorless, odorless gas that can harm your lungs. To learn more, go to www.health.[facility].[place].us and search for \"Radon in Homes.\"  Keep guns unloaded and locked up in a safe place like a safe or gun vault, or, use a gun lock and hide the keys. Always lock away bullets separately. To learn more, visit [employer].[place].gov and search for \"safe gun storage.\"  Nutrition  Eat 5 or more servings of fruits and vegetables each day.  Try wheat bread, brown rice and whole grain pasta (instead of white bread, rice, and pasta).  Get enough calcium and vitamin D. Check the label on foods and aim for 100% of the RDA (recommended daily allowance).  Regular exams  Have a dental exam and cleaning every 6 months.  See your health care team every year to talk about:  Any changes in your health.  Any medicines your care team has prescribed.  Preventive care, family planning, and ways to prevent chronic diseases.  Shots (vaccines)   HPV shots (up to age 26), if you've never had them before.  Hepatitis B shots (up to age 59), if you've never had them before.  COVID-19 shot: Get this shot when it's due.  Flu " shot: Get a flu shot every year.  Tetanus shot: Get a tetanus shot every 10 years.  Pneumococcal, hepatitis A, and RSV shots: Ask your care team if you need these based on your risk.  Shingles shot (for age 50 and up).  General health tests  Diabetes screening:  Starting at age 35, Get screened for diabetes at least every 3 years.  If you are younger than age 35, ask your care team if you should be screened for diabetes.  Cholesterol test: At age 39, start having a cholesterol test every 5 years, or more often if advised.  Bone density scan (DEXA): At age 50, ask your care team if you should have this scan for osteoporosis (brittle bones).  Hepatitis C: Get tested at least once in your life.  Abdominal aortic aneurysm screening: Talk to your doctor about having this screening if you:  Have ever smoked; and  Are biologically male; and  Are between the ages of 65 and 75.  STIs (sexually transmitted infections)  Before age 24: Ask your care team if you should be screened for STIs.  After age 24: Get screened for STIs if you're at risk. You are at risk for STIs (including HIV) if:  You are sexually active with more than one person.  You don't use condoms every time.  You or a partner was diagnosed with a sexually transmitted infection.  If you are at risk for HIV, ask about PrEP medicine to prevent HIV.  Get tested for HIV at least once in your life, whether you are at risk for HIV or not.  Cancer screening tests  Cervical cancer screening: If you have a cervix, begin getting regular cervical cancer screening tests at age 21. Most people who have regular screenings with normal results can stop after age 65. Talk about this with your provider.  Breast cancer scan (mammogram): If you've ever had breasts, begin having regular mammograms starting at age 40. This is a scan to check for breast cancer.  Colon cancer screening: It is important to start screening for colon cancer at age 45.  Have a colonoscopy test every 10  years (or more often if you're at risk) Or, ask your provider about stool tests like a FIT test every year or Cologuard test every 3 years.  To learn more about your testing options, visit: www.[employer]/493892.pdf.  For help making a decision, visit: celia/zz86442.  Prostate cancer screening test: If you have a prostate and are age 55 to 69, ask your provider if you would benefit from a yearly prostate cancer screening test.  Lung cancer screening: If you are a current or former smoker age 50 to 80, ask your care team if ongoing lung cancer screenings are right for you.  For informational purposes only. Not to replace the advice of your health care provider. Copyright   2023 [facility]. All rights reserved. Clinically reviewed by the [facility] Transitions Program. [employer] [id number] - REV 04/24.

## 2024-07-30 ENCOUNTER — HOSPITAL ENCOUNTER (OUTPATIENT)
Dept: MAMMOGRAPHY | Facility: CLINIC | Age: 54
Discharge: HOME | End: 2024-07-30
Attending: NURSE PRACTITIONER | Admitting: NURSE PRACTITIONER
Payer: COMMERCIAL

## 2024-07-30 DIAGNOSIS — Z12.31 ENCOUNTER FOR SCREENING MAMMOGRAM FOR BREAST CANCER: ICD-10-CM

## 2024-07-30 PROCEDURE — 77063 BREAST TOMOSYNTHESIS BI: CPT

## 2025-01-21 ENCOUNTER — APPOINTMENT (OUTPATIENT)
Dept: GENERAL RADIOLOGY | Facility: CLINIC | Age: 55
End: 2025-01-21
Payer: COMMERCIAL

## 2025-01-21 ENCOUNTER — HOSPITAL ENCOUNTER (EMERGENCY)
Facility: CLINIC | Age: 55
Discharge: HOME | End: 2025-01-21
Payer: COMMERCIAL

## 2025-01-21 VITALS
TEMPERATURE: 99.8 F | OXYGEN SATURATION: 92 % | SYSTOLIC BLOOD PRESSURE: 152 MMHG | RESPIRATION RATE: 18 BRPM | HEART RATE: 94 BPM | DIASTOLIC BLOOD PRESSURE: 71 MMHG

## 2025-01-21 DIAGNOSIS — J40 BRONCHITIS: ICD-10-CM

## 2025-01-21 PROCEDURE — 99213 OFFICE O/P EST LOW 20 MIN: CPT

## 2025-01-21 PROCEDURE — 71046 X-RAY EXAM CHEST 2 VIEWS: CPT

## 2025-01-21 PROCEDURE — G0463 HOSPITAL OUTPT CLINIC VISIT: HCPCS | Mod: 25

## 2025-01-21 RX ORDER — PREDNISONE 20 MG/1
TABLET ORAL
Qty: 10 TABLET | Refills: 0 | Status: SHIPPED | OUTPATIENT
Start: 2025-01-21

## 2025-01-21 RX ORDER — BENZONATATE 100 MG/1
200 CAPSULE ORAL 3 TIMES DAILY PRN
Qty: 15 CAPSULE | Refills: 0 | Status: SHIPPED | OUTPATIENT
Start: 2025-01-21 | End: 2025-01-26

## 2025-01-21 RX ORDER — AZITHROMYCIN 250 MG/1
TABLET, FILM COATED ORAL
Qty: 6 TABLET | Refills: 0 | Status: SHIPPED | OUTPATIENT
Start: 2025-01-21 | End: 2025-01-26

## 2025-01-21 ASSESSMENT — COLUMBIA-SUICIDE SEVERITY RATING SCALE - C-SSRS
6. HAVE YOU EVER DONE ANYTHING, STARTED TO DO ANYTHING, OR PREPARED TO DO ANYTHING TO END YOUR LIFE?: NO
2. HAVE YOU ACTUALLY HAD ANY THOUGHTS OF KILLING YOURSELF IN THE PAST MONTH?: NO
1. IN THE PAST MONTH, HAVE YOU WISHED YOU WERE DEAD OR WISHED YOU COULD GO TO SLEEP AND NOT WAKE UP?: NO

## 2025-01-21 NOTE — ED PROVIDER NOTES
History     Chief Complaint   Patient presents with    Cough     HPI  Piper Unger is a 54 year old female who presents urgent care with chief complaint of cough.  Patient reports she developed viral symptoms 7 days ago.  Patient reports symptoms included chills and body ache along with nasal congestion and cough.  All symptoms have improved however patient continues to have cough and fatigue.  Patient describes cough is productive and worse at nighttime.  She has been taking NyQuil and cough drops without relief.  Denies nausea, vomiting, shortness of breath, chest pain.    Allergies:  Allergies   Allergen Reactions    Dust Mites     Pollen Extract        Problem List:    Patient Active Problem List    Diagnosis Date Noted    Psoriasis 03/14/2022     Priority: Medium    Eczema, unspecified type 09/28/2020     Priority: Medium    Obesity (BMI 35.0-39.9) with comorbidity (H) 09/23/2019     Priority: Medium    Benign essential hypertension 04/12/2017     Priority: Medium    HYPERLIPIDEMIA LDL GOAL <130 10/31/2010     Priority: Medium    Excessive sweating 08/04/2010     Priority: Medium     Diagnosis updated by automated process. Provider to review and confirm.      Allergic rhinitis due to other allergen      Priority: Medium     seasonal allergies          Past Medical History:    Past Medical History:   Diagnosis Date    Allergic rhinitis due to other allergen     Hypertension     Irregular menstrual cycle     Other congenital anomaly of uterus     Pure hypercholesterolemia     Thyroid disease        Past Surgical History:    Past Surgical History:   Procedure Laterality Date    COLONOSCOPY N/A 3/15/2018    Procedure: COLONOSCOPY;  colonoscopy;  Surgeon: Shahzad Villafana MD;  Location: [facility]    NO HISTORY OF SURGERY         Family History:    Family History   Problem Relation Age of Onset    Hypertension Father     Skin Cancer Father     Atrial fibrillation Father         intermittent    Hypertension Sister      Hyperlipidemia Sister     Sleep Apnea Sister     Hypertension Brother     Sleep Apnea Brother     Glaucoma Maternal Grandmother     Skin Cancer Maternal Grandfather     Hypertension Paternal Grandmother     Cerebrovascular Disease Paternal Grandmother     Lactose Intolerance Daughter     Crohn's Disease Son     Allergies Son         eczema       Social History:  Marital Status:   [5]  Social History     Tobacco Use    Smoking status: Never    Smokeless tobacco: Never   Vaping Use    Vaping status: Never Used   Substance Use Topics    Alcohol use: Yes     Comment: occasional    Drug use: No        Medications:    azithromycin (ZITHROMAX) 250 MG tablet  benzonatate (TESSALON) 100 MG capsule  predniSONE (DELTASONE) 20 MG tablet  clobetasol (TEMOVATE) 0.05 % external ointment  clobetasol (TEMOVATE) 0.05 % external solution  ketoconazole (NIZORAL) 2 % external shampoo  levocetirizine (XYZAL) 5 MG tablet  lisinopril (ZESTRIL) 20 MG tablet  simvastatin (ZOCOR) 20 MG tablet  triamcinolone (KENALOG) 0.1 % external cream          Review of Systems   All other systems reviewed and are negative.      Physical Exam   BP: (!) 152/71  Pulse: 94  Temp: 99.8  F (37.7  C)  Resp: 18  SpO2: 92 %      Physical Exam  Vitals and nursing note reviewed.   Constitutional:       General: She is not in acute distress.     Appearance: Normal appearance. She is not ill-appearing or toxic-appearing.   HENT:      Head: Normocephalic.      Right Ear: Tympanic membrane, ear canal and external ear normal.      Left Ear: Tympanic membrane, ear canal and external ear normal.      Nose: No congestion or rhinorrhea.      Mouth/Throat:      Mouth: Mucous membranes are moist.      Pharynx: Posterior oropharyngeal erythema present.   Cardiovascular:      Rate and Rhythm: Normal rate and regular rhythm.      Pulses: Normal pulses.      Heart sounds: Normal heart sounds.   Pulmonary:      Effort: Pulmonary effort is normal. No respiratory distress.       Breath sounds: Normal breath sounds. No stridor. No wheezing, rhonchi or rales.   Chest:      Chest wall: No tenderness.   Musculoskeletal:      Cervical back: Neck supple.   Neurological:      Mental Status: She is alert.   Psychiatric:         Mood and Affect: Mood normal.         Behavior: Behavior normal.         ED Course        Procedures        Results for orders placed or performed during the hospital encounter of 01/21/25 (from the past 24 hours)   XR Chest 2 Views    Narrative    EXAM: XR CHEST 2 VIEWS  LOCATION: [facility]  DATE: 1/21/2025    INDICATION: Cough x 1 week  COMPARISON: None.      Impression    IMPRESSION: Negative chest.       Medications - No data to display    Assessments & Plan (with Medical Decision Making)     I have reviewed the nursing notes.    I have reviewed the findings, diagnosis, plan and need for follow up with the patient.      Medical Decision Making    54 year old female who presents urgent care with chief complaint of cough.  Patient reports she developed viral symptoms 7 days ago.  Patient reports symptoms included chills and body ache along with nasal congestion and cough.  All symptoms have improved however patient continues to have cough and fatigue.  Patient describes cough is productive and worse at nighttime.  She has been taking NyQuil and cough drops without relief.  Denies nausea, vomiting, shortness of breath, chest pain.    Exam above.  Patient in no acute distress, vitals WNL.      Chest x-ray obtained to rule out pneumonia and was personally reviewed revealing: No cardiopulmonary abnormalities.    We discussed probable bronchitis today.  I recommended treatment with azithromycin and prednisone.  Patient also prescribed Tessalon as needed for cough.  If symptoms are not improving over the next 5 days patient should follow-up with PCP.      Prior to making a final disposition on this patient the results of patient's tests and  other diagnostic studies were discussed with the patient. All questions were answered. Patient expressed understanding of the plan and was amenable to it.     Disclaimer: This note consists of symbols derived from keyboarding, dictation and/or voice recognition software. As a result, there may be errors in the script that have gone undetected. Please consider this when interpreting information found in this chart.        Discharge Medication List as of 1/21/2025 12:40 PM        START taking these medications    Details   azithromycin (ZITHROMAX) 250 MG tablet Take 2 tablets (500 mg) by mouth daily for 1 day, THEN 1 tablet (250 mg) daily for 4 days., Disp-6 tablet, R-0, E-Prescribe      benzonatate (TESSALON) 100 MG capsule Take 2 capsules (200 mg) by mouth 3 times daily as needed for cough., Disp-15 capsule, R-0, E-Prescribe      predniSONE (DELTASONE) 20 MG tablet Take two tablets (= 40mg) each day for 5 (five) days, Disp-10 tablet, R-0, E-Prescribe             Final diagnoses:   Bronchitis       1/21/2025   [facility] EMERGENCY DEPT       Janet Ng PA-C  01/21/25 [id number]

## 2025-01-21 NOTE — DISCHARGE INSTRUCTIONS
Start azithromycin and prednisone today for bronchitis.      You may use Tessalon as needed up to 3 times daily for cough.  This will not make you drowsy.    If symptoms are not improving over the next 4 to 5 days please follow-up with PCP.

## 2025-03-04 ENCOUNTER — PATIENT OUTREACH (OUTPATIENT)
Dept: FAMILY MEDICINE | Facility: CLINIC | Age: 55
End: 2025-03-04
Payer: COMMERCIAL

## 2025-03-04 NOTE — TELEPHONE ENCOUNTER
Patient is due for bp check. Please contact patient to provide a home blood pressure reading or schedule them for in clinic blood pressure check.  Azeb Wells NP on 3/4/2025 at 9:27 AM

## 2025-06-05 ENCOUNTER — TELEPHONE (OUTPATIENT)
Dept: FAMILY MEDICINE | Facility: CLINIC | Age: 55
End: 2025-06-05
Payer: COMMERCIAL

## 2025-06-05 NOTE — TELEPHONE ENCOUNTER
Patient Quality Outreach    Patient is due for the following:   Hypertension -  Hypertension follow-up visit    Action(s) Taken:   LEFT MESSAGE FOR PT TO CALL CLINIC    Type of outreach:    Phone, left message for patient/parent to call back.    Questions for provider review:    None         Anibal Francisco  Chart routed to None.

## 2025-06-25 DIAGNOSIS — I10 BENIGN ESSENTIAL HYPERTENSION: ICD-10-CM

## 2025-06-25 DIAGNOSIS — J30.2 SEASONAL ALLERGIC RHINITIS, UNSPECIFIED TRIGGER: ICD-10-CM

## 2025-06-25 DIAGNOSIS — E78.5 HYPERLIPIDEMIA LDL GOAL <130: ICD-10-CM

## 2025-06-25 RX ORDER — LISINOPRIL 20 MG/1
20 TABLET ORAL DAILY
Qty: 90 TABLET | Refills: 0 | Status: SHIPPED | OUTPATIENT
Start: 2025-06-25

## 2025-06-25 RX ORDER — LEVOCETIRIZINE DIHYDROCHLORIDE 5 MG/1
5 TABLET, FILM COATED ORAL EVERY EVENING
Qty: 90 TABLET | Refills: 0 | Status: SHIPPED | OUTPATIENT
Start: 2025-06-25

## 2025-06-25 RX ORDER — SIMVASTATIN 20 MG
20 TABLET ORAL AT BEDTIME
Qty: 90 TABLET | Refills: 0 | Status: SHIPPED | OUTPATIENT
Start: 2025-06-25

## 2025-06-25 NOTE — TELEPHONE ENCOUNTER
Need in person clinic appointment for further refills.   Prescription approved per [facility] Refill Protocol.  Julie Behrendt RN

## 2025-07-07 ENCOUNTER — TELEPHONE (OUTPATIENT)
Dept: FAMILY MEDICINE | Facility: CLINIC | Age: 55
End: 2025-07-07
Payer: COMMERCIAL

## 2025-07-07 NOTE — TELEPHONE ENCOUNTER
Patient Quality Outreach    Patient is due for the following:   Hypertension -  Hypertension follow-up visit    Action(s) Taken:   Pt to schedule    Type of outreach:    Phone, left message for patient/parent to call back.    Questions for provider review:    None         Anibal Francisco  Chart routed to None.

## 2025-08-02 ENCOUNTER — HEALTH MAINTENANCE LETTER (OUTPATIENT)
Age: 55
End: 2025-08-02

## (undated) RX ORDER — LIDOCAINE HYDROCHLORIDE 10 MG/ML
INJECTION, SOLUTION EPIDURAL; INFILTRATION; INTRACAUDAL; PERINEURAL
Status: DISPENSED
Start: 2018-03-15